# Patient Record
Sex: MALE | Race: BLACK OR AFRICAN AMERICAN | Employment: FULL TIME | URBAN - METROPOLITAN AREA
[De-identification: names, ages, dates, MRNs, and addresses within clinical notes are randomized per-mention and may not be internally consistent; named-entity substitution may affect disease eponyms.]

---

## 2018-01-01 ENCOUNTER — APPOINTMENT (OUTPATIENT)
Dept: GENERAL RADIOLOGY | Facility: HOSPITAL | Age: 53
DRG: 004 | End: 2018-01-01
Attending: INTERNAL MEDICINE
Payer: COMMERCIAL

## 2018-01-01 ENCOUNTER — APPOINTMENT (OUTPATIENT)
Dept: INTERVENTIONAL RADIOLOGY/VASCULAR | Facility: HOSPITAL | Age: 53
DRG: 004 | End: 2018-01-01
Attending: RADIOLOGY
Payer: COMMERCIAL

## 2018-01-01 ENCOUNTER — APPOINTMENT (OUTPATIENT)
Dept: GENERAL RADIOLOGY | Facility: HOSPITAL | Age: 53
DRG: 004 | End: 2018-01-01
Attending: SURGERY
Payer: COMMERCIAL

## 2018-01-01 ENCOUNTER — APPOINTMENT (OUTPATIENT)
Dept: CV DIAGNOSTICS | Facility: HOSPITAL | Age: 53
DRG: 004 | End: 2018-01-01
Attending: INTERNAL MEDICINE
Payer: COMMERCIAL

## 2018-01-01 ENCOUNTER — APPOINTMENT (OUTPATIENT)
Dept: INTERVENTIONAL RADIOLOGY/VASCULAR | Facility: HOSPITAL | Age: 53
DRG: 004 | End: 2018-01-01
Attending: CLINICAL NURSE SPECIALIST
Payer: COMMERCIAL

## 2018-01-01 ENCOUNTER — APPOINTMENT (OUTPATIENT)
Dept: INTERVENTIONAL RADIOLOGY/VASCULAR | Facility: HOSPITAL | Age: 53
DRG: 004 | End: 2018-01-01
Attending: INTERNAL MEDICINE
Payer: COMMERCIAL

## 2018-01-01 ENCOUNTER — APPOINTMENT (OUTPATIENT)
Dept: CT IMAGING | Facility: HOSPITAL | Age: 53
DRG: 004 | End: 2018-01-01
Attending: INTERNAL MEDICINE
Payer: COMMERCIAL

## 2018-01-01 ENCOUNTER — APPOINTMENT (OUTPATIENT)
Dept: CT IMAGING | Facility: HOSPITAL | Age: 53
DRG: 004 | End: 2018-01-01
Attending: SURGERY
Payer: COMMERCIAL

## 2018-01-01 ENCOUNTER — ANESTHESIA (OUTPATIENT)
Dept: SURGERY | Facility: HOSPITAL | Age: 53
DRG: 004 | End: 2018-01-01
Payer: COMMERCIAL

## 2018-01-01 ENCOUNTER — APPOINTMENT (OUTPATIENT)
Dept: GENERAL RADIOLOGY | Facility: HOSPITAL | Age: 53
DRG: 004 | End: 2018-01-01
Attending: CLINICAL NURSE SPECIALIST
Payer: COMMERCIAL

## 2018-01-01 ENCOUNTER — APPOINTMENT (OUTPATIENT)
Dept: MRI IMAGING | Facility: HOSPITAL | Age: 53
DRG: 004 | End: 2018-01-01
Attending: SURGERY
Payer: COMMERCIAL

## 2018-01-01 ENCOUNTER — LAB ENCOUNTER (OUTPATIENT)
Dept: LAB | Facility: HOSPITAL | Age: 53
End: 2018-01-01
Attending: INTERNAL MEDICINE
Payer: COMMERCIAL

## 2018-01-01 ENCOUNTER — ANESTHESIA EVENT (OUTPATIENT)
Dept: ENDOSCOPY | Facility: HOSPITAL | Age: 53
DRG: 004 | End: 2018-01-01
Payer: COMMERCIAL

## 2018-01-01 ENCOUNTER — ANESTHESIA (OUTPATIENT)
Dept: ENDOSCOPY | Facility: HOSPITAL | Age: 53
DRG: 004 | End: 2018-01-01
Payer: COMMERCIAL

## 2018-01-01 ENCOUNTER — ANESTHESIA EVENT (OUTPATIENT)
Dept: MEDSURG UNIT | Facility: HOSPITAL | Age: 53
End: 2018-01-01

## 2018-01-01 ENCOUNTER — APPOINTMENT (OUTPATIENT)
Dept: ULTRASOUND IMAGING | Facility: HOSPITAL | Age: 53
DRG: 004 | End: 2018-01-01
Attending: EMERGENCY MEDICINE
Payer: COMMERCIAL

## 2018-01-01 ENCOUNTER — HOSPITAL ENCOUNTER (INPATIENT)
Facility: HOSPITAL | Age: 53
LOS: 22 days | DRG: 004 | End: 2018-01-01
Attending: EMERGENCY MEDICINE | Admitting: INTERNAL MEDICINE
Payer: COMMERCIAL

## 2018-01-01 ENCOUNTER — APPOINTMENT (OUTPATIENT)
Dept: GENERAL RADIOLOGY | Facility: HOSPITAL | Age: 53
DRG: 004 | End: 2018-01-01
Attending: RADIOLOGY
Payer: COMMERCIAL

## 2018-01-01 ENCOUNTER — APPOINTMENT (OUTPATIENT)
Dept: GENERAL RADIOLOGY | Facility: HOSPITAL | Age: 53
DRG: 004 | End: 2018-01-01
Attending: HOSPITALIST
Payer: COMMERCIAL

## 2018-01-01 ENCOUNTER — APPOINTMENT (OUTPATIENT)
Dept: INTERVENTIONAL RADIOLOGY/VASCULAR | Facility: HOSPITAL | Age: 53
DRG: 004 | End: 2018-01-01
Attending: SURGERY
Payer: COMMERCIAL

## 2018-01-01 ENCOUNTER — APPOINTMENT (OUTPATIENT)
Dept: CT IMAGING | Facility: HOSPITAL | Age: 53
DRG: 004 | End: 2018-01-01
Attending: EMERGENCY MEDICINE
Payer: COMMERCIAL

## 2018-01-01 ENCOUNTER — ANESTHESIA (OUTPATIENT)
Dept: MEDSURG UNIT | Facility: HOSPITAL | Age: 53
End: 2018-01-01

## 2018-01-01 ENCOUNTER — APPOINTMENT (OUTPATIENT)
Dept: MRI IMAGING | Facility: HOSPITAL | Age: 53
DRG: 004 | End: 2018-01-01
Attending: INTERNAL MEDICINE
Payer: COMMERCIAL

## 2018-01-01 ENCOUNTER — ANESTHESIA EVENT (OUTPATIENT)
Dept: SURGERY | Facility: HOSPITAL | Age: 53
DRG: 004 | End: 2018-01-01
Payer: COMMERCIAL

## 2018-01-01 VITALS
RESPIRATION RATE: 19 BRPM | TEMPERATURE: 100 F | BODY MASS INDEX: 42.09 KG/M2 | HEIGHT: 70 IN | HEART RATE: 91 BPM | OXYGEN SATURATION: 42 % | DIASTOLIC BLOOD PRESSURE: 21 MMHG | SYSTOLIC BLOOD PRESSURE: 32 MMHG | WEIGHT: 294 LBS

## 2018-01-01 DIAGNOSIS — R35.89 POLYURIA: ICD-10-CM

## 2018-01-01 DIAGNOSIS — R94.5 LIVER FUNCTION ABNORMALITY: ICD-10-CM

## 2018-01-01 DIAGNOSIS — R53.83 FATIGUE: Primary | ICD-10-CM

## 2018-01-01 DIAGNOSIS — K85.90 ACUTE PANCREATITIS, UNSPECIFIED COMPLICATION STATUS, UNSPECIFIED PANCREATITIS TYPE: Primary | ICD-10-CM

## 2018-01-01 LAB
ALBUMIN SERPL BCP-MCNC: 1.6 G/DL (ref 3.5–4.8)
ALBUMIN SERPL BCP-MCNC: 1.7 G/DL (ref 3.5–4.8)
ALBUMIN SERPL BCP-MCNC: 1.7 G/DL (ref 3.5–4.8)
ALBUMIN SERPL BCP-MCNC: 1.8 G/DL (ref 3.5–4.8)
ALBUMIN SERPL BCP-MCNC: 1.9 G/DL (ref 3.5–4.8)
ALBUMIN SERPL BCP-MCNC: 2 G/DL (ref 3.5–4.8)
ALBUMIN SERPL BCP-MCNC: 2.1 G/DL (ref 3.5–4.8)
ALBUMIN SERPL BCP-MCNC: 2.6 G/DL (ref 3.5–4.8)
ALBUMIN SERPL BCP-MCNC: 2.8 G/DL (ref 3.5–4.8)
ALBUMIN SERPL BCP-MCNC: 3 G/DL (ref 3.5–4.8)
ALBUMIN SERPL BCP-MCNC: 3 G/DL (ref 3.5–4.8)
ALBUMIN SERPL BCP-MCNC: 3.5 G/DL (ref 3.5–4.8)
ALBUMIN SERPL BCP-MCNC: 4.4 G/DL (ref 3.5–4.8)
ALBUMIN/GLOB SERPL: 0.4 {RATIO} (ref 1–2)
ALBUMIN/GLOB SERPL: 0.4 {RATIO} (ref 1–2)
ALBUMIN/GLOB SERPL: 0.5 {RATIO} (ref 1–2)
ALBUMIN/GLOB SERPL: 0.7 {RATIO} (ref 1–2)
ALBUMIN/GLOB SERPL: 0.8 {RATIO} (ref 1–2)
ALBUMIN/GLOB SERPL: 1 {RATIO} (ref 1–2)
ALBUMIN/GLOB SERPL: 1.2 {RATIO} (ref 1–2)
ALP SERPL-CCNC: 104 U/L (ref 32–100)
ALP SERPL-CCNC: 104 U/L (ref 32–100)
ALP SERPL-CCNC: 112 U/L (ref 32–100)
ALP SERPL-CCNC: 48 U/L (ref 32–100)
ALP SERPL-CCNC: 53 U/L (ref 32–100)
ALP SERPL-CCNC: 55 U/L (ref 32–100)
ALP SERPL-CCNC: 70 U/L (ref 32–100)
ALP SERPL-CCNC: 71 U/L (ref 32–100)
ALP SERPL-CCNC: 72 U/L (ref 32–100)
ALP SERPL-CCNC: 78 U/L (ref 32–100)
ALP SERPL-CCNC: 84 U/L (ref 32–100)
ALP SERPL-CCNC: 85 U/L (ref 32–100)
ALP SERPL-CCNC: 86 U/L (ref 32–100)
ALP SERPL-CCNC: 89 U/L (ref 32–100)
ALT SERPL-CCNC: 106 U/L (ref 17–63)
ALT SERPL-CCNC: 125 U/L (ref 17–63)
ALT SERPL-CCNC: 214 U/L (ref 17–63)
ALT SERPL-CCNC: 219 U/L (ref 17–63)
ALT SERPL-CCNC: 229 U/L (ref 17–63)
ALT SERPL-CCNC: 35 U/L (ref 17–63)
ALT SERPL-CCNC: 354 U/L (ref 17–63)
ALT SERPL-CCNC: 356 U/L (ref 17–63)
ALT SERPL-CCNC: 427 U/L (ref 17–63)
ALT SERPL-CCNC: 53 U/L (ref 17–63)
ALT SERPL-CCNC: 58 U/L (ref 17–63)
ALT SERPL-CCNC: 59 U/L (ref 17–63)
ALT SERPL-CCNC: 86 U/L (ref 17–63)
ALT SERPL-CCNC: 91 U/L (ref 17–63)
AMYLASE FLD-CCNC: 6320 U/L
AMYLASE SERPL-CCNC: 1251 U/L (ref 24–108)
AMYLASE SERPL-CCNC: 59 U/L (ref 24–108)
AMYLASE SERPL-CCNC: 82 U/L (ref 24–108)
ANION GAP SERPL CALC-SCNC: 10 MMOL/L (ref 0–18)
ANION GAP SERPL CALC-SCNC: 11 MMOL/L (ref 0–18)
ANION GAP SERPL CALC-SCNC: 12 MMOL/L (ref 0–18)
ANION GAP SERPL CALC-SCNC: 13 MMOL/L (ref 0–18)
ANION GAP SERPL CALC-SCNC: 14 MMOL/L (ref 0–18)
ANION GAP SERPL CALC-SCNC: 14 MMOL/L (ref 0–18)
ANION GAP SERPL CALC-SCNC: 15 MMOL/L (ref 0–18)
ANION GAP SERPL CALC-SCNC: 16 MMOL/L (ref 0–18)
ANION GAP SERPL CALC-SCNC: 18 MMOL/L (ref 0–18)
ANION GAP SERPL CALC-SCNC: 19 MMOL/L (ref 0–18)
ANION GAP SERPL CALC-SCNC: 8 MMOL/L (ref 0–18)
ANION GAP SERPL CALC-SCNC: 9 MMOL/L (ref 0–18)
ANTIBODY SCREEN: NEGATIVE
APTT PPP: 100 SECONDS (ref 23.2–35.3)
APTT PPP: 106.1 SECONDS (ref 23.2–35.3)
APTT PPP: 119.9 SECONDS (ref 23.2–35.3)
APTT PPP: 126.5 SECONDS (ref 23.2–35.3)
APTT PPP: 137.6 SECONDS (ref 23.2–35.3)
APTT PPP: 148.8 SECONDS (ref 23.2–35.3)
APTT PPP: 30.8 SECONDS (ref 23.2–35.3)
APTT PPP: 31.8 SECONDS (ref 23.2–35.3)
APTT PPP: 32 SECONDS (ref 23.2–35.3)
APTT PPP: 37.7 SECONDS (ref 23.2–35.3)
APTT PPP: 38.3 SECONDS (ref 23.2–35.3)
APTT PPP: 40.5 SECONDS (ref 23.2–35.3)
APTT PPP: 42.2 SECONDS (ref 23.2–35.3)
APTT PPP: 43.4 SECONDS (ref 23.2–35.3)
APTT PPP: 45.1 SECONDS (ref 23.2–35.3)
APTT PPP: 45.5 SECONDS (ref 23.2–35.3)
APTT PPP: 49 SECONDS (ref 23.2–35.3)
APTT PPP: 49.4 SECONDS (ref 23.2–35.3)
APTT PPP: 50.7 SECONDS (ref 23.2–35.3)
APTT PPP: 59.6 SECONDS (ref 23.2–35.3)
APTT PPP: 59.6 SECONDS (ref 23.2–35.3)
APTT PPP: 59.8 SECONDS (ref 23.2–35.3)
APTT PPP: 61.5 SECONDS (ref 23.2–35.3)
APTT PPP: 62.1 SECONDS (ref 23.2–35.3)
APTT PPP: 71 SECONDS (ref 23.2–35.3)
APTT PPP: 77 SECONDS (ref 23.2–35.3)
APTT PPP: 77 SECONDS (ref 23.2–35.3)
APTT PPP: 77.3 SECONDS (ref 23.2–35.3)
APTT PPP: 77.3 SECONDS (ref 23.2–35.3)
APTT PPP: 79.4 SECONDS (ref 23.2–35.3)
APTT PPP: 79.6 SECONDS (ref 23.2–35.3)
APTT PPP: 83.1 SECONDS (ref 23.2–35.3)
APTT PPP: 96 SECONDS (ref 23.2–35.3)
AST SERPL-CCNC: 115 U/L (ref 15–41)
AST SERPL-CCNC: 121 U/L (ref 15–41)
AST SERPL-CCNC: 131 U/L (ref 15–41)
AST SERPL-CCNC: 137 U/L (ref 15–41)
AST SERPL-CCNC: 140 U/L (ref 15–41)
AST SERPL-CCNC: 160 U/L (ref 15–41)
AST SERPL-CCNC: 242 U/L (ref 15–41)
AST SERPL-CCNC: 272 U/L (ref 15–41)
AST SERPL-CCNC: 307 U/L (ref 15–41)
AST SERPL-CCNC: 580 U/L (ref 15–41)
AST SERPL-CCNC: 715 U/L (ref 15–41)
AST SERPL-CCNC: 72 U/L (ref 15–41)
AST SERPL-CCNC: 789 U/L (ref 15–41)
AST SERPL-CCNC: 91 U/L (ref 15–41)
BASE EXCESS BLD CALC-SCNC: -1.3 MMOL/L (ref ?–2)
BASE EXCESS BLD CALC-SCNC: -12.5 MMOL/L (ref ?–2)
BASE EXCESS BLD CALC-SCNC: -3.7 MMOL/L (ref ?–2)
BASE EXCESS BLD CALC-SCNC: -3.9 MMOL/L (ref ?–2)
BASE EXCESS BLD CALC-SCNC: -4.4 MMOL/L (ref ?–2)
BASE EXCESS BLD CALC-SCNC: -4.4 MMOL/L (ref ?–2)
BASE EXCESS BLD CALC-SCNC: -5 MMOL/L (ref ?–2)
BASE EXCESS BLD CALC-SCNC: -8.1 MMOL/L (ref ?–2)
BASE EXCESS BLD CALC-SCNC: 2.4 MMOL/L (ref ?–2)
BASOPHILS # BLD: 0 K/UL (ref 0–0.2)
BASOPHILS # BLD: 0.1 K/UL (ref 0–0.2)
BASOPHILS # BLD: 0.1 K/UL (ref 0–0.2)
BASOPHILS # BLD: 0.2 K/UL (ref 0–0.2)
BASOPHILS # BLD: 0.4 K/UL (ref 0–0.2)
BASOPHILS NFR BLD: 0 %
BASOPHILS NFR BLD: 1 %
BILIRUB DIRECT SERPL-MCNC: 0.4 MG/DL (ref 0–0.2)
BILIRUB DIRECT SERPL-MCNC: 0.5 MG/DL (ref 0–0.2)
BILIRUB DIRECT SERPL-MCNC: 0.8 MG/DL (ref 0–0.2)
BILIRUB DIRECT SERPL-MCNC: 1.9 MG/DL (ref 0–0.2)
BILIRUB DIRECT SERPL-MCNC: 2 MG/DL (ref 0–0.2)
BILIRUB DIRECT SERPL-MCNC: 3.3 MG/DL (ref 0–0.2)
BILIRUB SERPL-MCNC: 0.3 MG/DL (ref 0.3–1.2)
BILIRUB SERPL-MCNC: 0.8 MG/DL (ref 0.3–1.2)
BILIRUB SERPL-MCNC: 1 MG/DL (ref 0.3–1.2)
BILIRUB SERPL-MCNC: 1.2 MG/DL (ref 0.3–1.2)
BILIRUB SERPL-MCNC: 1.3 MG/DL (ref 0.3–1.2)
BILIRUB SERPL-MCNC: 1.3 MG/DL (ref 0.3–1.2)
BILIRUB SERPL-MCNC: 2.6 MG/DL (ref 0.3–1.2)
BILIRUB SERPL-MCNC: 3.2 MG/DL (ref 0.3–1.2)
BILIRUB SERPL-MCNC: 3.5 MG/DL (ref 0.3–1.2)
BILIRUB SERPL-MCNC: 3.7 MG/DL (ref 0.3–1.2)
BILIRUB SERPL-MCNC: 4.5 MG/DL (ref 0.3–1.2)
BILIRUB SERPL-MCNC: 5 MG/DL (ref 0.3–1.2)
BILIRUB UR QL: NEGATIVE
BLOOD GAS EPAP: 5 CM H2O
BLOOD GAS IPAP: 12 CM H2O
BLOOD TYPE BARCODE: 5100
BLOOD TYPE BARCODE: 9500
BLOOD TYPE BARCODE: 9500
BUN SERPL-MCNC: 102 MG/DL (ref 8–20)
BUN SERPL-MCNC: 103 MG/DL (ref 8–20)
BUN SERPL-MCNC: 106 MG/DL (ref 8–20)
BUN SERPL-MCNC: 107 MG/DL (ref 8–20)
BUN SERPL-MCNC: 109 MG/DL (ref 8–20)
BUN SERPL-MCNC: 111 MG/DL (ref 8–20)
BUN SERPL-MCNC: 114 MG/DL (ref 8–20)
BUN SERPL-MCNC: 115 MG/DL (ref 8–20)
BUN SERPL-MCNC: 117 MG/DL (ref 8–20)
BUN SERPL-MCNC: 120 MG/DL (ref 8–20)
BUN SERPL-MCNC: 125 MG/DL (ref 8–20)
BUN SERPL-MCNC: 13 MG/DL (ref 8–20)
BUN SERPL-MCNC: 19 MG/DL (ref 8–20)
BUN SERPL-MCNC: 19 MG/DL (ref 8–20)
BUN SERPL-MCNC: 35 MG/DL (ref 8–20)
BUN SERPL-MCNC: 46 MG/DL (ref 8–20)
BUN SERPL-MCNC: 49 MG/DL (ref 8–20)
BUN SERPL-MCNC: 49 MG/DL (ref 8–20)
BUN SERPL-MCNC: 51 MG/DL (ref 8–20)
BUN SERPL-MCNC: 60 MG/DL (ref 8–20)
BUN SERPL-MCNC: 60 MG/DL (ref 8–20)
BUN SERPL-MCNC: 66 MG/DL (ref 8–20)
BUN SERPL-MCNC: 74 MG/DL (ref 8–20)
BUN SERPL-MCNC: 75 MG/DL (ref 8–20)
BUN SERPL-MCNC: 76 MG/DL (ref 8–20)
BUN SERPL-MCNC: 76 MG/DL (ref 8–20)
BUN SERPL-MCNC: 77 MG/DL (ref 8–20)
BUN SERPL-MCNC: 79 MG/DL (ref 8–20)
BUN SERPL-MCNC: 79 MG/DL (ref 8–20)
BUN SERPL-MCNC: 81 MG/DL (ref 8–20)
BUN SERPL-MCNC: 81 MG/DL (ref 8–20)
BUN SERPL-MCNC: 82 MG/DL (ref 8–20)
BUN SERPL-MCNC: 84 MG/DL (ref 8–20)
BUN SERPL-MCNC: 84 MG/DL (ref 8–20)
BUN SERPL-MCNC: 85 MG/DL (ref 8–20)
BUN SERPL-MCNC: 86 MG/DL (ref 8–20)
BUN SERPL-MCNC: 86 MG/DL (ref 8–20)
BUN SERPL-MCNC: 87 MG/DL (ref 8–20)
BUN SERPL-MCNC: 88 MG/DL (ref 8–20)
BUN SERPL-MCNC: 88 MG/DL (ref 8–20)
BUN SERPL-MCNC: 89 MG/DL (ref 8–20)
BUN SERPL-MCNC: 91 MG/DL (ref 8–20)
BUN SERPL-MCNC: 96 MG/DL (ref 8–20)
BUN SERPL-MCNC: 97 MG/DL (ref 8–20)
BUN SERPL-MCNC: 98 MG/DL (ref 8–20)
BUN SERPL-MCNC: 98 MG/DL (ref 8–20)
BUN/CREAT SERPL: 10 (ref 10–20)
BUN/CREAT SERPL: 10.4 (ref 10–20)
BUN/CREAT SERPL: 10.5 (ref 10–20)
BUN/CREAT SERPL: 10.9 (ref 10–20)
BUN/CREAT SERPL: 11 (ref 10–20)
BUN/CREAT SERPL: 11.5 (ref 10–20)
BUN/CREAT SERPL: 12.8 (ref 10–20)
BUN/CREAT SERPL: 13.7 (ref 10–20)
BUN/CREAT SERPL: 13.8 (ref 10–20)
BUN/CREAT SERPL: 13.9 (ref 10–20)
BUN/CREAT SERPL: 16 (ref 10–20)
BUN/CREAT SERPL: 17.1 (ref 10–20)
BUN/CREAT SERPL: 17.2 (ref 10–20)
BUN/CREAT SERPL: 17.3 (ref 10–20)
BUN/CREAT SERPL: 17.9 (ref 10–20)
BUN/CREAT SERPL: 18 (ref 10–20)
BUN/CREAT SERPL: 18.1 (ref 10–20)
BUN/CREAT SERPL: 19.8 (ref 10–20)
BUN/CREAT SERPL: 19.9 (ref 10–20)
BUN/CREAT SERPL: 20.6 (ref 10–20)
BUN/CREAT SERPL: 20.6 (ref 10–20)
BUN/CREAT SERPL: 20.8 (ref 10–20)
BUN/CREAT SERPL: 20.9 (ref 10–20)
BUN/CREAT SERPL: 21.1 (ref 10–20)
BUN/CREAT SERPL: 21.3 (ref 10–20)
BUN/CREAT SERPL: 21.5 (ref 10–20)
BUN/CREAT SERPL: 22 (ref 10–20)
BUN/CREAT SERPL: 22.3 (ref 10–20)
BUN/CREAT SERPL: 22.7 (ref 10–20)
BUN/CREAT SERPL: 23.1 (ref 10–20)
BUN/CREAT SERPL: 24.5 (ref 10–20)
BUN/CREAT SERPL: 24.5 (ref 10–20)
BUN/CREAT SERPL: 24.8 (ref 10–20)
BUN/CREAT SERPL: 25.1 (ref 10–20)
BUN/CREAT SERPL: 26.1 (ref 10–20)
BUN/CREAT SERPL: 26.9 (ref 10–20)
BUN/CREAT SERPL: 30.6 (ref 10–20)
BUN/CREAT SERPL: 33.5 (ref 10–20)
BUN/CREAT SERPL: 34.5 (ref 10–20)
BUN/CREAT SERPL: 9 (ref 10–20)
BUN/CREAT SERPL: 9.1 (ref 10–20)
BUN/CREAT SERPL: 9.2 (ref 10–20)
BUN/CREAT SERPL: 9.5 (ref 10–20)
BUN/CREAT SERPL: 9.7 (ref 10–20)
BUN/CREAT SERPL: 9.7 (ref 10–20)
BUN/CREAT SERPL: 9.9 (ref 10–20)
C DIFF TOX B STL QL: NEGATIVE
CA-I BLD-SCNC: 0.92 MMOL/L (ref 0.95–1.32)
CA-I BLD-SCNC: 0.99 MMOL/L (ref 0.95–1.32)
CA-I BLD-SCNC: 1.01 MMOL/L (ref 0.95–1.32)
CA-I BLD-SCNC: 1.06 MMOL/L (ref 0.95–1.32)
CA-I BLD-SCNC: 1.07 MMOL/L (ref 0.95–1.32)
CA-I BLD-SCNC: 1.08 MMOL/L (ref 0.95–1.32)
CA-I BLD-SCNC: 1.1 MMOL/L (ref 0.95–1.32)
CA-I BLD-SCNC: 1.1 MMOL/L (ref 0.95–1.32)
CALCIUM IONIZED PH 7.4: 1.07 MMOL/L
CALCIUM IONIZED, SERUM: 1.12 MMOL/L
CALCIUM SERPL-MCNC: 5.4 MG/DL (ref 8.5–10.5)
CALCIUM SERPL-MCNC: 6 MG/DL (ref 8.5–10.5)
CALCIUM SERPL-MCNC: 6.2 MG/DL (ref 8.5–10.5)
CALCIUM SERPL-MCNC: 6.3 MG/DL (ref 8.5–10.5)
CALCIUM SERPL-MCNC: 6.5 MG/DL (ref 8.5–10.5)
CALCIUM SERPL-MCNC: 6.6 MG/DL (ref 8.5–10.5)
CALCIUM SERPL-MCNC: 6.8 MG/DL (ref 8.5–10.5)
CALCIUM SERPL-MCNC: 6.9 MG/DL (ref 8.5–10.5)
CALCIUM SERPL-MCNC: 7.2 MG/DL (ref 8.5–10.5)
CALCIUM SERPL-MCNC: 7.4 MG/DL (ref 8.5–10.5)
CALCIUM SERPL-MCNC: 7.5 MG/DL (ref 8.5–10.5)
CALCIUM SERPL-MCNC: 7.6 MG/DL (ref 8.5–10.5)
CALCIUM SERPL-MCNC: 7.7 MG/DL (ref 8.5–10.5)
CALCIUM SERPL-MCNC: 7.8 MG/DL (ref 8.5–10.5)
CALCIUM SERPL-MCNC: 7.9 MG/DL (ref 8.5–10.5)
CALCIUM SERPL-MCNC: 8 MG/DL (ref 8.5–10.5)
CALCIUM SERPL-MCNC: 8.1 MG/DL (ref 8.5–10.5)
CALCIUM SERPL-MCNC: 8.2 MG/DL (ref 8.5–10.5)
CALCIUM SERPL-MCNC: 9.4 MG/DL (ref 8.5–10.5)
CHLORIDE SERPL-SCNC: 100 MMOL/L (ref 95–110)
CHLORIDE SERPL-SCNC: 101 MMOL/L (ref 95–110)
CHLORIDE SERPL-SCNC: 102 MMOL/L (ref 95–110)
CHLORIDE SERPL-SCNC: 103 MMOL/L (ref 95–110)
CHLORIDE SERPL-SCNC: 104 MMOL/L (ref 95–110)
CHLORIDE SERPL-SCNC: 105 MMOL/L (ref 95–110)
CHLORIDE SERPL-SCNC: 106 MMOL/L (ref 95–110)
CHLORIDE SERPL-SCNC: 107 MMOL/L (ref 95–110)
CHLORIDE SERPL-SCNC: 107 MMOL/L (ref 95–110)
CHLORIDE SERPL-SCNC: 108 MMOL/L (ref 95–110)
CHLORIDE SERPL-SCNC: 108 MMOL/L (ref 95–110)
CHLORIDE SERPL-SCNC: 109 MMOL/L (ref 95–110)
CHLORIDE SERPL-SCNC: 109 MMOL/L (ref 95–110)
CHLORIDE SERPL-SCNC: 111 MMOL/L (ref 95–110)
CHLORIDE SERPL-SCNC: 97 MMOL/L (ref 95–110)
CHLORIDE SERPL-SCNC: 99 MMOL/L (ref 95–110)
CK SERPL-CCNC: 1178 U/L (ref 49–397)
CK SERPL-CCNC: 2650 U/L (ref 49–397)
CK SERPL-CCNC: 6340 U/L (ref 49–397)
CK SERPL-CCNC: 7170 U/L (ref 49–397)
CLARITY UR: CLEAR
CMV IGG SERPL QL: NEGATIVE
CMV IGM SERPL QL: NEGATIVE
CO2 SERPL-SCNC: 14 MMOL/L (ref 22–32)
CO2 SERPL-SCNC: 15 MMOL/L (ref 22–32)
CO2 SERPL-SCNC: 19 MMOL/L (ref 22–32)
CO2 SERPL-SCNC: 19 MMOL/L (ref 22–32)
CO2 SERPL-SCNC: 20 MMOL/L (ref 22–32)
CO2 SERPL-SCNC: 21 MMOL/L (ref 22–32)
CO2 SERPL-SCNC: 22 MMOL/L (ref 22–32)
CO2 SERPL-SCNC: 23 MMOL/L (ref 22–32)
CO2 SERPL-SCNC: 24 MMOL/L (ref 22–32)
CO2 SERPL-SCNC: 25 MMOL/L (ref 22–32)
CO2 SERPL-SCNC: 27 MMOL/L (ref 22–32)
CO2 SERPL-SCNC: 27 MMOL/L (ref 22–32)
COHGB MFR BLD: 1.7 % (ref 0–1.5)
COHGB MFR BLD: 2 % (ref 0–1.5)
COHGB MFR BLD: 2.4 % (ref 0–1.5)
COLOR UR: YELLOW
CREAT SERPL-MCNC: 1.31 MG/DL (ref 0.5–1.5)
CREAT SERPL-MCNC: 1.65 MG/DL (ref 0.5–1.5)
CREAT SERPL-MCNC: 1.73 MG/DL (ref 0.5–1.5)
CREAT SERPL-MCNC: 10.42 MG/DL (ref 0.5–1.5)
CREAT SERPL-MCNC: 2.87 MG/DL (ref 0.5–1.5)
CREAT SERPL-MCNC: 2.88 MG/DL (ref 0.5–1.5)
CREAT SERPL-MCNC: 3.03 MG/DL (ref 0.5–1.5)
CREAT SERPL-MCNC: 3.16 MG/DL (ref 0.5–1.5)
CREAT SERPL-MCNC: 3.33 MG/DL (ref 0.5–1.5)
CREAT SERPL-MCNC: 3.36 MG/DL (ref 0.5–1.5)
CREAT SERPL-MCNC: 3.69 MG/DL (ref 0.5–1.5)
CREAT SERPL-MCNC: 3.75 MG/DL (ref 0.5–1.5)
CREAT SERPL-MCNC: 3.79 MG/DL (ref 0.5–1.5)
CREAT SERPL-MCNC: 3.83 MG/DL (ref 0.5–1.5)
CREAT SERPL-MCNC: 3.88 MG/DL (ref 0.5–1.5)
CREAT SERPL-MCNC: 3.92 MG/DL (ref 0.5–1.5)
CREAT SERPL-MCNC: 3.92 MG/DL (ref 0.5–1.5)
CREAT SERPL-MCNC: 3.96 MG/DL (ref 0.5–1.5)
CREAT SERPL-MCNC: 4.14 MG/DL (ref 0.5–1.5)
CREAT SERPL-MCNC: 4.31 MG/DL (ref 0.5–1.5)
CREAT SERPL-MCNC: 4.37 MG/DL (ref 0.5–1.5)
CREAT SERPL-MCNC: 4.48 MG/DL (ref 0.5–1.5)
CREAT SERPL-MCNC: 4.49 MG/DL (ref 0.5–1.5)
CREAT SERPL-MCNC: 4.58 MG/DL (ref 0.5–1.5)
CREAT SERPL-MCNC: 4.59 MG/DL (ref 0.5–1.5)
CREAT SERPL-MCNC: 4.62 MG/DL (ref 0.5–1.5)
CREAT SERPL-MCNC: 4.69 MG/DL (ref 0.5–1.5)
CREAT SERPL-MCNC: 4.72 MG/DL (ref 0.5–1.5)
CREAT SERPL-MCNC: 4.72 MG/DL (ref 0.5–1.5)
CREAT SERPL-MCNC: 4.92 MG/DL (ref 0.5–1.5)
CREAT SERPL-MCNC: 4.94 MG/DL (ref 0.5–1.5)
CREAT SERPL-MCNC: 4.98 MG/DL (ref 0.5–1.5)
CREAT SERPL-MCNC: 4.98 MG/DL (ref 0.5–1.5)
CREAT SERPL-MCNC: 5.11 MG/DL (ref 0.5–1.5)
CREAT SERPL-MCNC: 5.15 MG/DL (ref 0.5–1.5)
CREAT SERPL-MCNC: 5.39 MG/DL (ref 0.5–1.5)
CREAT SERPL-MCNC: 5.69 MG/DL (ref 0.5–1.5)
CREAT SERPL-MCNC: 5.97 MG/DL (ref 0.5–1.5)
CREAT SERPL-MCNC: 6.06 MG/DL (ref 0.5–1.5)
CREAT SERPL-MCNC: 6.07 MG/DL (ref 0.5–1.5)
CREAT SERPL-MCNC: 6.17 MG/DL (ref 0.5–1.5)
CREAT SERPL-MCNC: 6.2 MG/DL (ref 0.5–1.5)
CREAT SERPL-MCNC: 6.82 MG/DL (ref 0.5–1.5)
CREAT SERPL-MCNC: 7.5 MG/DL (ref 0.5–1.5)
CREAT SERPL-MCNC: 8.07 MG/DL (ref 0.5–1.5)
CREAT SERPL-MCNC: 8.41 MG/DL (ref 0.5–1.5)
CREAT SERPL-MCNC: 8.47 MG/DL (ref 0.5–1.5)
CREAT SERPL-MCNC: 9.1 MG/DL (ref 0.5–1.5)
CREAT SERPL-MCNC: 9.27 MG/DL (ref 0.5–1.5)
CREAT SERPL-MCNC: 9.82 MG/DL (ref 0.5–1.5)
CREAT UR-MCNC: 260.7 MG/DL
EBV NA IGG SER QL IA: POSITIVE
EBV VCA IGG SER QL IA: POSITIVE
EBV VCA IGM SER QL IA: NEGATIVE
EOSINOPHIL # BLD: 0 K/UL (ref 0–0.7)
EOSINOPHIL # BLD: 0.2 K/UL (ref 0–0.7)
EOSINOPHIL # BLD: 0.3 K/UL (ref 0–0.7)
EOSINOPHIL # BLD: 0.4 K/UL (ref 0–0.7)
EOSINOPHIL # BLD: 0.4 K/UL (ref 0–0.7)
EOSINOPHIL # BLD: 0.5 K/UL (ref 0–0.7)
EOSINOPHIL # BLD: 0.5 K/UL (ref 0–0.7)
EOSINOPHIL # BLD: 0.7 K/UL (ref 0–0.7)
EOSINOPHIL # BLD: 1.3 K/UL (ref 0–0.7)
EOSINOPHIL # BLD: 2.1 K/UL (ref 0–0.7)
EOSINOPHIL NFR BLD: 0 %
EOSINOPHIL NFR BLD: 1 %
EOSINOPHIL NFR BLD: 2 %
EOSINOPHIL NFR BLD: 4 %
EOSINOPHIL NFR BLD: 5 %
ERYTHROCYTE [DISTWIDTH] IN BLOOD BY AUTOMATED COUNT: 14 % (ref 11–15)
ERYTHROCYTE [DISTWIDTH] IN BLOOD BY AUTOMATED COUNT: 14 % (ref 11–15)
ERYTHROCYTE [DISTWIDTH] IN BLOOD BY AUTOMATED COUNT: 14.1 % (ref 11–15)
ERYTHROCYTE [DISTWIDTH] IN BLOOD BY AUTOMATED COUNT: 14.2 % (ref 11–15)
ERYTHROCYTE [DISTWIDTH] IN BLOOD BY AUTOMATED COUNT: 14.2 % (ref 11–15)
ERYTHROCYTE [DISTWIDTH] IN BLOOD BY AUTOMATED COUNT: 14.4 % (ref 11–15)
ERYTHROCYTE [DISTWIDTH] IN BLOOD BY AUTOMATED COUNT: 14.9 % (ref 11–15)
ERYTHROCYTE [DISTWIDTH] IN BLOOD BY AUTOMATED COUNT: 15 % (ref 11–15)
ERYTHROCYTE [DISTWIDTH] IN BLOOD BY AUTOMATED COUNT: 15.1 % (ref 11–15)
ERYTHROCYTE [DISTWIDTH] IN BLOOD BY AUTOMATED COUNT: 15.4 % (ref 11–15)
ERYTHROCYTE [DISTWIDTH] IN BLOOD BY AUTOMATED COUNT: 15.6 % (ref 11–15)
ERYTHROCYTE [DISTWIDTH] IN BLOOD BY AUTOMATED COUNT: 16 % (ref 11–15)
ERYTHROCYTE [DISTWIDTH] IN BLOOD BY AUTOMATED COUNT: 16.1 % (ref 11–15)
ERYTHROCYTE [DISTWIDTH] IN BLOOD BY AUTOMATED COUNT: 16.2 % (ref 11–15)
ERYTHROCYTE [DISTWIDTH] IN BLOOD BY AUTOMATED COUNT: 16.4 % (ref 11–15)
ERYTHROCYTE [DISTWIDTH] IN BLOOD BY AUTOMATED COUNT: 16.6 % (ref 11–15)
ERYTHROCYTE [DISTWIDTH] IN BLOOD BY AUTOMATED COUNT: 16.8 % (ref 11–15)
ERYTHROCYTE [DISTWIDTH] IN BLOOD BY AUTOMATED COUNT: 16.9 % (ref 11–15)
ERYTHROCYTE [DISTWIDTH] IN BLOOD BY AUTOMATED COUNT: 18.1 % (ref 11–15)
ERYTHROCYTE [DISTWIDTH] IN BLOOD BY AUTOMATED COUNT: 18.2 % (ref 11–15)
ERYTHROCYTE [DISTWIDTH] IN BLOOD BY AUTOMATED COUNT: 19 % (ref 11–15)
ERYTHROCYTE [DISTWIDTH] IN BLOOD BY AUTOMATED COUNT: 19.3 % (ref 11–15)
ERYTHROCYTE [DISTWIDTH] IN BLOOD BY AUTOMATED COUNT: 19.5 % (ref 11–15)
ERYTHROCYTE [DISTWIDTH] IN BLOOD BY AUTOMATED COUNT: 19.5 % (ref 11–15)
ERYTHROCYTE [DISTWIDTH] IN BLOOD BY AUTOMATED COUNT: 19.8 % (ref 11–15)
ETHANOL SERPL-MCNC: 3 MG/DL
GLOBULIN PLAS-MCNC: 2.9 G/DL (ref 2.5–3.7)
GLOBULIN PLAS-MCNC: 2.9 G/DL (ref 2.5–3.7)
GLOBULIN PLAS-MCNC: 3 G/DL (ref 2.5–3.7)
GLOBULIN PLAS-MCNC: 3.1 G/DL (ref 2.5–3.7)
GLOBULIN PLAS-MCNC: 3.9 G/DL (ref 2.5–3.7)
GLOBULIN PLAS-MCNC: 4.2 G/DL (ref 2.5–3.7)
GLOBULIN PLAS-MCNC: 4.3 G/DL (ref 2.5–3.7)
GLOBULIN PLAS-MCNC: 4.4 G/DL (ref 2.5–3.7)
GLOBULIN PLAS-MCNC: 4.4 G/DL (ref 2.5–3.7)
GLOBULIN PLAS-MCNC: 4.5 G/DL (ref 2.5–3.7)
GLUCOSE BLDC GLUCOMTR-MCNC: 103 MG/DL (ref 70–99)
GLUCOSE BLDC GLUCOMTR-MCNC: 107 MG/DL (ref 70–99)
GLUCOSE BLDC GLUCOMTR-MCNC: 109 MG/DL (ref 70–99)
GLUCOSE BLDC GLUCOMTR-MCNC: 109 MG/DL (ref 70–99)
GLUCOSE BLDC GLUCOMTR-MCNC: 110 MG/DL (ref 70–99)
GLUCOSE BLDC GLUCOMTR-MCNC: 110 MG/DL (ref 70–99)
GLUCOSE BLDC GLUCOMTR-MCNC: 111 MG/DL (ref 70–99)
GLUCOSE BLDC GLUCOMTR-MCNC: 113 MG/DL (ref 70–99)
GLUCOSE BLDC GLUCOMTR-MCNC: 114 MG/DL (ref 70–99)
GLUCOSE BLDC GLUCOMTR-MCNC: 114 MG/DL (ref 70–99)
GLUCOSE BLDC GLUCOMTR-MCNC: 115 MG/DL (ref 70–99)
GLUCOSE BLDC GLUCOMTR-MCNC: 116 MG/DL (ref 70–99)
GLUCOSE BLDC GLUCOMTR-MCNC: 116 MG/DL (ref 70–99)
GLUCOSE BLDC GLUCOMTR-MCNC: 117 MG/DL (ref 70–99)
GLUCOSE BLDC GLUCOMTR-MCNC: 118 MG/DL (ref 70–99)
GLUCOSE BLDC GLUCOMTR-MCNC: 120 MG/DL (ref 70–99)
GLUCOSE BLDC GLUCOMTR-MCNC: 121 MG/DL (ref 70–99)
GLUCOSE BLDC GLUCOMTR-MCNC: 121 MG/DL (ref 70–99)
GLUCOSE BLDC GLUCOMTR-MCNC: 122 MG/DL (ref 70–99)
GLUCOSE BLDC GLUCOMTR-MCNC: 123 MG/DL (ref 70–99)
GLUCOSE BLDC GLUCOMTR-MCNC: 124 MG/DL (ref 70–99)
GLUCOSE BLDC GLUCOMTR-MCNC: 125 MG/DL (ref 70–99)
GLUCOSE BLDC GLUCOMTR-MCNC: 125 MG/DL (ref 70–99)
GLUCOSE BLDC GLUCOMTR-MCNC: 127 MG/DL (ref 70–99)
GLUCOSE BLDC GLUCOMTR-MCNC: 128 MG/DL (ref 70–99)
GLUCOSE BLDC GLUCOMTR-MCNC: 129 MG/DL (ref 70–99)
GLUCOSE BLDC GLUCOMTR-MCNC: 130 MG/DL (ref 70–99)
GLUCOSE BLDC GLUCOMTR-MCNC: 131 MG/DL (ref 70–99)
GLUCOSE BLDC GLUCOMTR-MCNC: 132 MG/DL (ref 70–99)
GLUCOSE BLDC GLUCOMTR-MCNC: 133 MG/DL (ref 70–99)
GLUCOSE BLDC GLUCOMTR-MCNC: 134 MG/DL (ref 70–99)
GLUCOSE BLDC GLUCOMTR-MCNC: 135 MG/DL (ref 70–99)
GLUCOSE BLDC GLUCOMTR-MCNC: 135 MG/DL (ref 70–99)
GLUCOSE BLDC GLUCOMTR-MCNC: 136 MG/DL (ref 70–99)
GLUCOSE BLDC GLUCOMTR-MCNC: 137 MG/DL (ref 70–99)
GLUCOSE BLDC GLUCOMTR-MCNC: 138 MG/DL (ref 70–99)
GLUCOSE BLDC GLUCOMTR-MCNC: 139 MG/DL (ref 70–99)
GLUCOSE BLDC GLUCOMTR-MCNC: 139 MG/DL (ref 70–99)
GLUCOSE BLDC GLUCOMTR-MCNC: 140 MG/DL (ref 70–99)
GLUCOSE BLDC GLUCOMTR-MCNC: 141 MG/DL (ref 70–99)
GLUCOSE BLDC GLUCOMTR-MCNC: 142 MG/DL (ref 70–99)
GLUCOSE BLDC GLUCOMTR-MCNC: 143 MG/DL (ref 70–99)
GLUCOSE BLDC GLUCOMTR-MCNC: 144 MG/DL (ref 70–99)
GLUCOSE BLDC GLUCOMTR-MCNC: 144 MG/DL (ref 70–99)
GLUCOSE BLDC GLUCOMTR-MCNC: 145 MG/DL (ref 70–99)
GLUCOSE BLDC GLUCOMTR-MCNC: 146 MG/DL (ref 70–99)
GLUCOSE BLDC GLUCOMTR-MCNC: 147 MG/DL (ref 70–99)
GLUCOSE BLDC GLUCOMTR-MCNC: 148 MG/DL (ref 70–99)
GLUCOSE BLDC GLUCOMTR-MCNC: 149 MG/DL (ref 70–99)
GLUCOSE BLDC GLUCOMTR-MCNC: 150 MG/DL (ref 70–99)
GLUCOSE BLDC GLUCOMTR-MCNC: 151 MG/DL (ref 70–99)
GLUCOSE BLDC GLUCOMTR-MCNC: 151 MG/DL (ref 70–99)
GLUCOSE BLDC GLUCOMTR-MCNC: 152 MG/DL (ref 70–99)
GLUCOSE BLDC GLUCOMTR-MCNC: 152 MG/DL (ref 70–99)
GLUCOSE BLDC GLUCOMTR-MCNC: 153 MG/DL (ref 70–99)
GLUCOSE BLDC GLUCOMTR-MCNC: 154 MG/DL (ref 70–99)
GLUCOSE BLDC GLUCOMTR-MCNC: 155 MG/DL (ref 70–99)
GLUCOSE BLDC GLUCOMTR-MCNC: 156 MG/DL (ref 70–99)
GLUCOSE BLDC GLUCOMTR-MCNC: 156 MG/DL (ref 70–99)
GLUCOSE BLDC GLUCOMTR-MCNC: 157 MG/DL (ref 70–99)
GLUCOSE BLDC GLUCOMTR-MCNC: 157 MG/DL (ref 70–99)
GLUCOSE BLDC GLUCOMTR-MCNC: 158 MG/DL (ref 70–99)
GLUCOSE BLDC GLUCOMTR-MCNC: 159 MG/DL (ref 70–99)
GLUCOSE BLDC GLUCOMTR-MCNC: 160 MG/DL (ref 70–99)
GLUCOSE BLDC GLUCOMTR-MCNC: 161 MG/DL (ref 70–99)
GLUCOSE BLDC GLUCOMTR-MCNC: 162 MG/DL (ref 70–99)
GLUCOSE BLDC GLUCOMTR-MCNC: 163 MG/DL (ref 70–99)
GLUCOSE BLDC GLUCOMTR-MCNC: 164 MG/DL (ref 70–99)
GLUCOSE BLDC GLUCOMTR-MCNC: 165 MG/DL (ref 70–99)
GLUCOSE BLDC GLUCOMTR-MCNC: 166 MG/DL (ref 70–99)
GLUCOSE BLDC GLUCOMTR-MCNC: 167 MG/DL (ref 70–99)
GLUCOSE BLDC GLUCOMTR-MCNC: 167 MG/DL (ref 70–99)
GLUCOSE BLDC GLUCOMTR-MCNC: 168 MG/DL (ref 70–99)
GLUCOSE BLDC GLUCOMTR-MCNC: 169 MG/DL (ref 70–99)
GLUCOSE BLDC GLUCOMTR-MCNC: 170 MG/DL (ref 70–99)
GLUCOSE BLDC GLUCOMTR-MCNC: 171 MG/DL (ref 70–99)
GLUCOSE BLDC GLUCOMTR-MCNC: 172 MG/DL (ref 70–99)
GLUCOSE BLDC GLUCOMTR-MCNC: 173 MG/DL (ref 70–99)
GLUCOSE BLDC GLUCOMTR-MCNC: 174 MG/DL (ref 70–99)
GLUCOSE BLDC GLUCOMTR-MCNC: 175 MG/DL (ref 70–99)
GLUCOSE BLDC GLUCOMTR-MCNC: 176 MG/DL (ref 70–99)
GLUCOSE BLDC GLUCOMTR-MCNC: 177 MG/DL (ref 70–99)
GLUCOSE BLDC GLUCOMTR-MCNC: 178 MG/DL (ref 70–99)
GLUCOSE BLDC GLUCOMTR-MCNC: 179 MG/DL (ref 70–99)
GLUCOSE BLDC GLUCOMTR-MCNC: 180 MG/DL (ref 70–99)
GLUCOSE BLDC GLUCOMTR-MCNC: 181 MG/DL (ref 70–99)
GLUCOSE BLDC GLUCOMTR-MCNC: 182 MG/DL (ref 70–99)
GLUCOSE BLDC GLUCOMTR-MCNC: 183 MG/DL (ref 70–99)
GLUCOSE BLDC GLUCOMTR-MCNC: 184 MG/DL (ref 70–99)
GLUCOSE BLDC GLUCOMTR-MCNC: 185 MG/DL (ref 70–99)
GLUCOSE BLDC GLUCOMTR-MCNC: 186 MG/DL (ref 70–99)
GLUCOSE BLDC GLUCOMTR-MCNC: 187 MG/DL (ref 70–99)
GLUCOSE BLDC GLUCOMTR-MCNC: 188 MG/DL (ref 70–99)
GLUCOSE BLDC GLUCOMTR-MCNC: 189 MG/DL (ref 70–99)
GLUCOSE BLDC GLUCOMTR-MCNC: 190 MG/DL (ref 70–99)
GLUCOSE BLDC GLUCOMTR-MCNC: 191 MG/DL (ref 70–99)
GLUCOSE BLDC GLUCOMTR-MCNC: 192 MG/DL (ref 70–99)
GLUCOSE BLDC GLUCOMTR-MCNC: 193 MG/DL (ref 70–99)
GLUCOSE BLDC GLUCOMTR-MCNC: 194 MG/DL (ref 70–99)
GLUCOSE BLDC GLUCOMTR-MCNC: 195 MG/DL (ref 70–99)
GLUCOSE BLDC GLUCOMTR-MCNC: 196 MG/DL (ref 70–99)
GLUCOSE BLDC GLUCOMTR-MCNC: 197 MG/DL (ref 70–99)
GLUCOSE BLDC GLUCOMTR-MCNC: 197 MG/DL (ref 70–99)
GLUCOSE BLDC GLUCOMTR-MCNC: 198 MG/DL (ref 70–99)
GLUCOSE BLDC GLUCOMTR-MCNC: 198 MG/DL (ref 70–99)
GLUCOSE BLDC GLUCOMTR-MCNC: 199 MG/DL (ref 70–99)
GLUCOSE BLDC GLUCOMTR-MCNC: 200 MG/DL (ref 70–99)
GLUCOSE BLDC GLUCOMTR-MCNC: 201 MG/DL (ref 70–99)
GLUCOSE BLDC GLUCOMTR-MCNC: 201 MG/DL (ref 70–99)
GLUCOSE BLDC GLUCOMTR-MCNC: 202 MG/DL (ref 70–99)
GLUCOSE BLDC GLUCOMTR-MCNC: 203 MG/DL (ref 70–99)
GLUCOSE BLDC GLUCOMTR-MCNC: 204 MG/DL (ref 70–99)
GLUCOSE BLDC GLUCOMTR-MCNC: 205 MG/DL (ref 70–99)
GLUCOSE BLDC GLUCOMTR-MCNC: 206 MG/DL (ref 70–99)
GLUCOSE BLDC GLUCOMTR-MCNC: 207 MG/DL (ref 70–99)
GLUCOSE BLDC GLUCOMTR-MCNC: 208 MG/DL (ref 70–99)
GLUCOSE BLDC GLUCOMTR-MCNC: 210 MG/DL (ref 70–99)
GLUCOSE BLDC GLUCOMTR-MCNC: 211 MG/DL (ref 70–99)
GLUCOSE BLDC GLUCOMTR-MCNC: 212 MG/DL (ref 70–99)
GLUCOSE BLDC GLUCOMTR-MCNC: 213 MG/DL (ref 70–99)
GLUCOSE BLDC GLUCOMTR-MCNC: 215 MG/DL (ref 70–99)
GLUCOSE BLDC GLUCOMTR-MCNC: 216 MG/DL (ref 70–99)
GLUCOSE BLDC GLUCOMTR-MCNC: 218 MG/DL (ref 70–99)
GLUCOSE BLDC GLUCOMTR-MCNC: 219 MG/DL (ref 70–99)
GLUCOSE BLDC GLUCOMTR-MCNC: 221 MG/DL (ref 70–99)
GLUCOSE BLDC GLUCOMTR-MCNC: 222 MG/DL (ref 70–99)
GLUCOSE BLDC GLUCOMTR-MCNC: 227 MG/DL (ref 70–99)
GLUCOSE BLDC GLUCOMTR-MCNC: 228 MG/DL (ref 70–99)
GLUCOSE BLDC GLUCOMTR-MCNC: 230 MG/DL (ref 70–99)
GLUCOSE BLDC GLUCOMTR-MCNC: 236 MG/DL (ref 70–99)
GLUCOSE BLDC GLUCOMTR-MCNC: 246 MG/DL (ref 70–99)
GLUCOSE BLDC GLUCOMTR-MCNC: 248 MG/DL (ref 70–99)
GLUCOSE BLDC GLUCOMTR-MCNC: 250 MG/DL (ref 70–99)
GLUCOSE BLDC GLUCOMTR-MCNC: 253 MG/DL (ref 70–99)
GLUCOSE BLDC GLUCOMTR-MCNC: 266 MG/DL (ref 70–99)
GLUCOSE BLDC GLUCOMTR-MCNC: 276 MG/DL (ref 70–99)
GLUCOSE BLDC GLUCOMTR-MCNC: 277 MG/DL (ref 70–99)
GLUCOSE BLDC GLUCOMTR-MCNC: 294 MG/DL (ref 70–99)
GLUCOSE BLDC GLUCOMTR-MCNC: 306 MG/DL (ref 70–99)
GLUCOSE BLDC GLUCOMTR-MCNC: 319 MG/DL (ref 70–99)
GLUCOSE BLDC GLUCOMTR-MCNC: 352 MG/DL (ref 70–99)
GLUCOSE BLDC GLUCOMTR-MCNC: 359 MG/DL (ref 70–99)
GLUCOSE BLDC GLUCOMTR-MCNC: 360 MG/DL (ref 70–99)
GLUCOSE BLDC GLUCOMTR-MCNC: 368 MG/DL (ref 70–99)
GLUCOSE BLDC GLUCOMTR-MCNC: 482 MG/DL (ref 70–99)
GLUCOSE BLDC GLUCOMTR-MCNC: 67 MG/DL (ref 70–99)
GLUCOSE BLDC GLUCOMTR-MCNC: 73 MG/DL (ref 70–99)
GLUCOSE BLDC GLUCOMTR-MCNC: 79 MG/DL (ref 70–99)
GLUCOSE BLDC GLUCOMTR-MCNC: 79 MG/DL (ref 70–99)
GLUCOSE BLDC GLUCOMTR-MCNC: 81 MG/DL (ref 70–99)
GLUCOSE BLDC GLUCOMTR-MCNC: 81 MG/DL (ref 70–99)
GLUCOSE BLDC GLUCOMTR-MCNC: 82 MG/DL (ref 70–99)
GLUCOSE BLDC GLUCOMTR-MCNC: 97 MG/DL (ref 70–99)
GLUCOSE BLDC GLUCOMTR-MCNC: 97 MG/DL (ref 70–99)
GLUCOSE BLDC GLUCOMTR-MCNC: 98 MG/DL (ref 70–99)
GLUCOSE BLDC GLUCOMTR-MCNC: >500 MG/DL (ref 70–99)
GLUCOSE BLDC GLUCOMTR-MCNC: >500 MG/DL (ref 70–99)
GLUCOSE SERPL-MCNC: 128 MG/DL (ref 70–99)
GLUCOSE SERPL-MCNC: 142 MG/DL (ref 70–99)
GLUCOSE SERPL-MCNC: 143 MG/DL (ref 70–99)
GLUCOSE SERPL-MCNC: 144 MG/DL (ref 70–99)
GLUCOSE SERPL-MCNC: 147 MG/DL (ref 70–99)
GLUCOSE SERPL-MCNC: 147 MG/DL (ref 70–99)
GLUCOSE SERPL-MCNC: 149 MG/DL (ref 70–99)
GLUCOSE SERPL-MCNC: 150 MG/DL (ref 70–99)
GLUCOSE SERPL-MCNC: 152 MG/DL (ref 70–99)
GLUCOSE SERPL-MCNC: 153 MG/DL (ref 70–99)
GLUCOSE SERPL-MCNC: 153 MG/DL (ref 70–99)
GLUCOSE SERPL-MCNC: 154 MG/DL (ref 70–99)
GLUCOSE SERPL-MCNC: 155 MG/DL (ref 70–99)
GLUCOSE SERPL-MCNC: 157 MG/DL (ref 70–99)
GLUCOSE SERPL-MCNC: 159 MG/DL (ref 70–99)
GLUCOSE SERPL-MCNC: 161 MG/DL (ref 70–99)
GLUCOSE SERPL-MCNC: 163 MG/DL (ref 70–99)
GLUCOSE SERPL-MCNC: 163 MG/DL (ref 70–99)
GLUCOSE SERPL-MCNC: 164 MG/DL (ref 70–99)
GLUCOSE SERPL-MCNC: 169 MG/DL (ref 70–99)
GLUCOSE SERPL-MCNC: 171 MG/DL (ref 70–99)
GLUCOSE SERPL-MCNC: 174 MG/DL (ref 70–99)
GLUCOSE SERPL-MCNC: 177 MG/DL (ref 70–99)
GLUCOSE SERPL-MCNC: 179 MG/DL (ref 70–99)
GLUCOSE SERPL-MCNC: 180 MG/DL (ref 70–99)
GLUCOSE SERPL-MCNC: 180 MG/DL (ref 70–99)
GLUCOSE SERPL-MCNC: 181 MG/DL (ref 70–99)
GLUCOSE SERPL-MCNC: 182 MG/DL (ref 70–99)
GLUCOSE SERPL-MCNC: 184 MG/DL (ref 70–99)
GLUCOSE SERPL-MCNC: 185 MG/DL (ref 70–99)
GLUCOSE SERPL-MCNC: 191 MG/DL (ref 70–99)
GLUCOSE SERPL-MCNC: 191 MG/DL (ref 70–99)
GLUCOSE SERPL-MCNC: 192 MG/DL (ref 70–99)
GLUCOSE SERPL-MCNC: 193 MG/DL (ref 70–99)
GLUCOSE SERPL-MCNC: 196 MG/DL (ref 70–99)
GLUCOSE SERPL-MCNC: 199 MG/DL (ref 70–99)
GLUCOSE SERPL-MCNC: 220 MG/DL (ref 70–99)
GLUCOSE SERPL-MCNC: 236 MG/DL (ref 70–99)
GLUCOSE SERPL-MCNC: 244 MG/DL (ref 70–99)
GLUCOSE SERPL-MCNC: 255 MG/DL (ref 70–99)
GLUCOSE SERPL-MCNC: 265 MG/DL (ref 70–99)
GLUCOSE SERPL-MCNC: 270 MG/DL (ref 70–99)
GLUCOSE SERPL-MCNC: 379 MG/DL (ref 70–99)
GLUCOSE SERPL-MCNC: 480 MG/DL (ref 70–99)
GLUCOSE SERPL-MCNC: 480 MG/DL (ref 70–99)
GLUCOSE SERPL-MCNC: 98 MG/DL (ref 70–99)
GLUCOSE UR-MCNC: NEGATIVE MG/DL
HAPTOGLOB SERPL-MCNC: <15 MG/DL (ref 16–200)
HBA1C MFR BLD: 5.1 % (ref 4–6)
HBV CORE AB SERPL QL IA: NONREACTIVE
HBV SURFACE AB SER-ACNC: <3.1 MIU/ML (ref ?–10)
HBV SURFACE AG SERPL QL IA: NONREACTIVE
HBV SURFACE AG SERPL QL IA: NONREACTIVE
HCO3 BLDA-SCNC: 14 MEQ/L (ref 21–27)
HCO3 BLDA-SCNC: 16.1 MEQ/L (ref 21–27)
HCO3 BLDA-SCNC: 20.5 MEQ/L (ref 21–27)
HCO3 BLDA-SCNC: 20.5 MEQ/L (ref 21–27)
HCO3 BLDA-SCNC: 21.6 MEQ/L (ref 21–27)
HCO3 BLDA-SCNC: 21.9 MEQ/L (ref 21–27)
HCO3 BLDA-SCNC: 22 MEQ/L (ref 21–27)
HCO3 BLDA-SCNC: 22.8 MEQ/L (ref 21–27)
HCO3 BLDA-SCNC: 26.8 MEQ/L (ref 21–27)
HCT VFR BLD AUTO: 18.8 % (ref 41–52)
HCT VFR BLD AUTO: 19.1 % (ref 41–52)
HCT VFR BLD AUTO: 20.2 % (ref 41–52)
HCT VFR BLD AUTO: 20.3 % (ref 41–52)
HCT VFR BLD AUTO: 20.3 % (ref 41–52)
HCT VFR BLD AUTO: 20.4 % (ref 41–52)
HCT VFR BLD AUTO: 20.4 % (ref 41–52)
HCT VFR BLD AUTO: 20.6 % (ref 41–52)
HCT VFR BLD AUTO: 21.4 % (ref 41–52)
HCT VFR BLD AUTO: 21.8 % (ref 41–52)
HCT VFR BLD AUTO: 21.8 % (ref 41–52)
HCT VFR BLD AUTO: 21.9 % (ref 41–52)
HCT VFR BLD AUTO: 22 % (ref 41–52)
HCT VFR BLD AUTO: 22.1 % (ref 41–52)
HCT VFR BLD AUTO: 22.2 % (ref 41–52)
HCT VFR BLD AUTO: 22.5 % (ref 41–52)
HCT VFR BLD AUTO: 22.5 % (ref 41–52)
HCT VFR BLD AUTO: 22.6 % (ref 41–52)
HCT VFR BLD AUTO: 22.9 % (ref 41–52)
HCT VFR BLD AUTO: 22.9 % (ref 41–52)
HCT VFR BLD AUTO: 23.5 % (ref 41–52)
HCT VFR BLD AUTO: 23.5 % (ref 41–52)
HCT VFR BLD AUTO: 24 % (ref 41–52)
HCT VFR BLD AUTO: 24.1 % (ref 41–52)
HCT VFR BLD AUTO: 24.3 % (ref 41–52)
HCT VFR BLD AUTO: 24.3 % (ref 41–52)
HCT VFR BLD AUTO: 24.6 % (ref 41–52)
HCT VFR BLD AUTO: 24.9 % (ref 41–52)
HCT VFR BLD AUTO: 25.6 % (ref 41–52)
HCT VFR BLD AUTO: 25.7 % (ref 41–52)
HCT VFR BLD AUTO: 26.1 % (ref 41–52)
HCT VFR BLD AUTO: 26.7 % (ref 41–52)
HCT VFR BLD AUTO: 26.9 % (ref 41–52)
HCT VFR BLD AUTO: 29.1 % (ref 41–52)
HCT VFR BLD AUTO: 38.4 % (ref 41–52)
HCT VFR BLD AUTO: 42.8 % (ref 41–52)
HCT VFR BLD AUTO: 48.2 % (ref 41–52)
HCT VFR BLD AUTO: 48.8 % (ref 41–52)
HEPARIN AB PPP QL PL AGG: NEGATIVE
HEPARIN AB PPP QL PL AGG: NEGATIVE
HGB BLD-MCNC: 10.3 G/DL (ref 13.5–17.5)
HGB BLD-MCNC: 11 G/DL (ref 13.5–17.5)
HGB BLD-MCNC: 12.5 G/DL (ref 13.5–17.5)
HGB BLD-MCNC: 13.2 G/DL (ref 13.5–17.5)
HGB BLD-MCNC: 13.9 G/DL (ref 13.5–17.5)
HGB BLD-MCNC: 13.9 G/DL (ref 13.5–17.5)
HGB BLD-MCNC: 14.8 G/DL (ref 13.5–17.5)
HGB BLD-MCNC: 15.7 G/DL (ref 13.5–17.5)
HGB BLD-MCNC: 15.9 G/DL (ref 13.5–17.5)
HGB BLD-MCNC: 5.3 G/DL (ref 13.5–17.5)
HGB BLD-MCNC: 5.9 G/DL (ref 13.5–17.5)
HGB BLD-MCNC: 6 G/DL (ref 13.5–17.5)
HGB BLD-MCNC: 6.3 G/DL (ref 13.5–17.5)
HGB BLD-MCNC: 6.4 G/DL (ref 13.5–17.5)
HGB BLD-MCNC: 6.5 G/DL (ref 13.5–17.5)
HGB BLD-MCNC: 6.6 G/DL (ref 13.5–17.5)
HGB BLD-MCNC: 6.7 G/DL (ref 13.5–17.5)
HGB BLD-MCNC: 6.7 G/DL (ref 13.5–17.5)
HGB BLD-MCNC: 6.8 G/DL (ref 13.5–17.5)
HGB BLD-MCNC: 6.8 G/DL (ref 13.5–17.5)
HGB BLD-MCNC: 6.9 G/DL (ref 13.5–17.5)
HGB BLD-MCNC: 7 G/DL (ref 13.5–17.5)
HGB BLD-MCNC: 7 G/DL (ref 13.5–17.5)
HGB BLD-MCNC: 7.1 G/DL (ref 13.5–17.5)
HGB BLD-MCNC: 7.2 G/DL (ref 13.5–17.5)
HGB BLD-MCNC: 7.5 G/DL (ref 13.5–17.5)
HGB BLD-MCNC: 7.6 G/DL (ref 13.5–17.5)
HGB BLD-MCNC: 7.7 G/DL (ref 13.5–17.5)
HGB BLD-MCNC: 7.8 G/DL (ref 13.5–17.5)
HGB BLD-MCNC: 7.8 G/DL (ref 13.5–17.5)
HGB BLD-MCNC: 7.9 G/DL (ref 13.5–17.5)
HGB BLD-MCNC: 7.9 G/DL (ref 13.5–17.5)
HGB BLD-MCNC: 8 G/DL (ref 13.5–17.5)
HGB BLD-MCNC: 8 G/DL (ref 13.5–17.5)
HGB BLD-MCNC: 8.2 G/DL (ref 13.5–17.5)
HGB BLD-MCNC: 8.5 G/DL (ref 13.5–17.5)
HGB BLD-MCNC: 8.6 G/DL (ref 13.5–17.5)
HGB BLD-MCNC: 9.7 G/DL (ref 13.5–17.5)
HGB BLD-MCNC: 9.9 G/DL (ref 13.5–17.5)
HGB UR QL STRIP.AUTO: NEGATIVE
IMMUNOGLOBULIN G SUBCLASS 1: 615 MG/DL
IMMUNOGLOBULIN G SUBCLASS 2: 481 MG/DL
IMMUNOGLOBULIN G SUBCLASS 3: 31 MG/DL
IMMUNOGLOBULIN G SUBCLASS 4: 83 MG/DL
INR BLD: 1.1 (ref 0.9–1.2)
INR BLD: 1.2 (ref 0.9–1.2)
INR BLD: 1.3 (ref 0.9–1.2)
INR BLD: 1.3 (ref 0.9–1.2)
INR BLD: 1.4 (ref 0.9–1.2)
INR BLD: 1.6 (ref 0.9–1.2)
LACTATE SERPL-SCNC: 1.4 MMOL/L (ref 0.5–2.2)
LACTATE SERPL-SCNC: 2 MMOL/L (ref 0.5–2.2)
LACTATE SERPL-SCNC: 2 MMOL/L (ref 0.5–2.2)
LACTATE SERPL-SCNC: 2.2 MMOL/L (ref 0.5–2.2)
LACTATE SERPL-SCNC: 3.3 MMOL/L (ref 0.5–2.2)
LACTATE SERPL-SCNC: 4.6 MMOL/L (ref 0.5–2.2)
LACTATE SERPL-SCNC: 4.7 MMOL/L (ref 0.5–2.2)
LACTATE SERPL-SCNC: 5.1 MMOL/L (ref 0.5–2.2)
LACTATE SERPL-SCNC: 7.1 MMOL/L (ref 0.5–2.2)
LACTATE SERPL-SCNC: 8.6 MMOL/L (ref 0.5–2.2)
LDH SERPL L TO P-CCNC: 1104 U/L (ref 98–192)
LDH SERPL L TO P-CCNC: 1192 U/L (ref 98–192)
LDH SERPL L TO P-CCNC: 1295 U/L (ref 98–192)
LDH SERPL L TO P-CCNC: 1462 U/L (ref 98–192)
LDH SERPL L TO P-CCNC: 1507 U/L (ref 98–192)
LDH SERPL L TO P-CCNC: 2185 U/L (ref 98–192)
LDH SERPL L TO P-CCNC: 623 U/L (ref 98–192)
LDH SERPL L TO P-CCNC: 687 U/L (ref 98–192)
LEUKOCYTE ESTERASE UR QL STRIP.AUTO: NEGATIVE
LIPASE SERPL-CCNC: 103 U/L (ref 22–51)
LIPASE SERPL-CCNC: 105 U/L (ref 22–51)
LIPASE SERPL-CCNC: 1459 U/L (ref 22–51)
LIPASE SERPL-CCNC: 211 U/L (ref 22–51)
LIPASE SERPL-CCNC: 57 U/L (ref 22–51)
LIPASE SERPL-CCNC: 575 U/L (ref 22–51)
LIPASE SERPL-CCNC: 73 U/L (ref 22–51)
LIPASE SERPL-CCNC: 8316 U/L (ref 22–51)
LYMPHOCYTES # BLD: 0.6 K/UL (ref 1–4)
LYMPHOCYTES # BLD: 0.6 K/UL (ref 1–4)
LYMPHOCYTES # BLD: 0.8 K/UL (ref 1–4)
LYMPHOCYTES # BLD: 0.9 K/UL (ref 1–4)
LYMPHOCYTES # BLD: 1.2 K/UL (ref 1–4)
LYMPHOCYTES # BLD: 1.2 K/UL (ref 1–4)
LYMPHOCYTES # BLD: 1.4 K/UL (ref 1–4)
LYMPHOCYTES # BLD: 1.7 K/UL (ref 1–4)
LYMPHOCYTES # BLD: 1.9 K/UL (ref 1–4)
LYMPHOCYTES # BLD: 1.9 K/UL (ref 1–4)
LYMPHOCYTES # BLD: 2 K/UL (ref 1–4)
LYMPHOCYTES # BLD: 2.3 K/UL (ref 1–4)
LYMPHOCYTES # BLD: 2.6 K/UL (ref 1–4)
LYMPHOCYTES # BLD: 2.8 K/UL (ref 1–4)
LYMPHOCYTES # BLD: 3 K/UL (ref 1–4)
LYMPHOCYTES # BLD: 3.8 K/UL (ref 1–4)
LYMPHOCYTES # BLD: 4 K/UL (ref 1–4)
LYMPHOCYTES # BLD: 4.8 K/UL (ref 1–4)
LYMPHOCYTES # BLD: 5.2 K/UL (ref 1–4)
LYMPHOCYTES NFR BLD: 10 %
LYMPHOCYTES NFR BLD: 10 %
LYMPHOCYTES NFR BLD: 11 %
LYMPHOCYTES NFR BLD: 12 %
LYMPHOCYTES NFR BLD: 21 %
LYMPHOCYTES NFR BLD: 3 %
LYMPHOCYTES NFR BLD: 3 %
LYMPHOCYTES NFR BLD: 4 %
LYMPHOCYTES NFR BLD: 6 %
LYMPHOCYTES NFR BLD: 6 %
LYMPHOCYTES NFR BLD: 7 %
LYMPHOCYTES NFR BLD: 7 %
LYMPHOCYTES NFR BLD: 8 %
LYMPHOCYTES NFR BLD: 8 %
LYMPHOCYTES NFR BLD: 9 %
MAGNESIUM SERPL-MCNC: 1.7 MG/DL (ref 1.8–2.5)
MAGNESIUM SERPL-MCNC: 1.9 MG/DL (ref 1.8–2.5)
MAGNESIUM SERPL-MCNC: 2.2 MG/DL (ref 1.8–2.5)
MAGNESIUM SERPL-MCNC: 2.2 MG/DL (ref 1.8–2.5)
MAGNESIUM SERPL-MCNC: 2.3 MG/DL (ref 1.8–2.5)
MAGNESIUM SERPL-MCNC: 2.4 MG/DL (ref 1.8–2.5)
MAGNESIUM SERPL-MCNC: 2.5 MG/DL (ref 1.8–2.5)
MAGNESIUM SERPL-MCNC: 2.5 MG/DL (ref 1.8–2.5)
MAGNESIUM SERPL-MCNC: 2.6 MG/DL (ref 1.8–2.5)
MAGNESIUM SERPL-MCNC: 2.9 MG/DL (ref 1.8–2.5)
MCH RBC QN AUTO: 27.6 PG (ref 27–32)
MCH RBC QN AUTO: 28 PG (ref 27–32)
MCH RBC QN AUTO: 28 PG (ref 27–32)
MCH RBC QN AUTO: 28.1 PG (ref 27–32)
MCH RBC QN AUTO: 28.2 PG (ref 27–32)
MCH RBC QN AUTO: 28.3 PG (ref 27–32)
MCH RBC QN AUTO: 28.4 PG (ref 27–32)
MCH RBC QN AUTO: 28.4 PG (ref 27–32)
MCH RBC QN AUTO: 28.5 PG (ref 27–32)
MCH RBC QN AUTO: 28.6 PG (ref 27–32)
MCH RBC QN AUTO: 28.7 PG (ref 27–32)
MCH RBC QN AUTO: 29.1 PG (ref 27–32)
MCH RBC QN AUTO: 29.3 PG (ref 27–32)
MCH RBC QN AUTO: 29.5 PG (ref 27–32)
MCH RBC QN AUTO: 29.9 PG (ref 27–32)
MCH RBC QN AUTO: 30 PG (ref 27–32)
MCH RBC QN AUTO: 30.2 PG (ref 27–32)
MCH RBC QN AUTO: 30.2 PG (ref 27–32)
MCH RBC QN AUTO: 30.4 PG (ref 27–32)
MCH RBC QN AUTO: 30.5 PG (ref 27–32)
MCH RBC QN AUTO: 30.6 PG (ref 27–32)
MCH RBC QN AUTO: 30.7 PG (ref 27–32)
MCHC RBC AUTO-ENTMCNC: 30.2 G/DL (ref 32–37)
MCHC RBC AUTO-ENTMCNC: 30.9 G/DL (ref 32–37)
MCHC RBC AUTO-ENTMCNC: 31.2 G/DL (ref 32–37)
MCHC RBC AUTO-ENTMCNC: 31.3 G/DL (ref 32–37)
MCHC RBC AUTO-ENTMCNC: 31.3 G/DL (ref 32–37)
MCHC RBC AUTO-ENTMCNC: 31.5 G/DL (ref 32–37)
MCHC RBC AUTO-ENTMCNC: 31.5 G/DL (ref 32–37)
MCHC RBC AUTO-ENTMCNC: 31.7 G/DL (ref 32–37)
MCHC RBC AUTO-ENTMCNC: 31.9 G/DL (ref 32–37)
MCHC RBC AUTO-ENTMCNC: 32 G/DL (ref 32–37)
MCHC RBC AUTO-ENTMCNC: 32.2 G/DL (ref 32–37)
MCHC RBC AUTO-ENTMCNC: 32.4 G/DL (ref 32–37)
MCHC RBC AUTO-ENTMCNC: 32.5 G/DL (ref 32–37)
MCHC RBC AUTO-ENTMCNC: 32.6 G/DL (ref 32–37)
MCHC RBC AUTO-ENTMCNC: 32.7 G/DL (ref 32–37)
MCHC RBC AUTO-ENTMCNC: 32.8 G/DL (ref 32–37)
MCHC RBC AUTO-ENTMCNC: 33 G/DL (ref 32–37)
MCHC RBC AUTO-ENTMCNC: 33.2 G/DL (ref 32–37)
MCV RBC AUTO: 84.5 FL (ref 80–100)
MCV RBC AUTO: 85.6 FL (ref 80–100)
MCV RBC AUTO: 86.1 FL (ref 80–100)
MCV RBC AUTO: 86.3 FL (ref 80–100)
MCV RBC AUTO: 86.5 FL (ref 80–100)
MCV RBC AUTO: 86.9 FL (ref 80–100)
MCV RBC AUTO: 87 FL (ref 80–100)
MCV RBC AUTO: 87.5 FL (ref 80–100)
MCV RBC AUTO: 88.5 FL (ref 80–100)
MCV RBC AUTO: 88.6 FL (ref 80–100)
MCV RBC AUTO: 88.7 FL (ref 80–100)
MCV RBC AUTO: 89.8 FL (ref 80–100)
MCV RBC AUTO: 90.3 FL (ref 80–100)
MCV RBC AUTO: 90.6 FL (ref 80–100)
MCV RBC AUTO: 91 FL (ref 80–100)
MCV RBC AUTO: 92.2 FL (ref 80–100)
MCV RBC AUTO: 93 FL (ref 80–100)
MCV RBC AUTO: 94.6 FL (ref 80–100)
MCV RBC AUTO: 95.6 FL (ref 80–100)
MCV RBC AUTO: 95.7 FL (ref 80–100)
MCV RBC AUTO: 95.9 FL (ref 80–100)
MCV RBC AUTO: 96.3 FL (ref 80–100)
MCV RBC AUTO: 96.5 FL (ref 80–100)
MCV RBC AUTO: 98.2 FL (ref 80–100)
MCV RBC AUTO: 98.8 FL (ref 80–100)
METAMYELOCYTES # BLD MANUAL: 0.14 K/UL
METAMYELOCYTES # BLD MANUAL: 0.23 K/UL
METAMYELOCYTES # BLD MANUAL: 0.29 K/UL
METAMYELOCYTES # BLD MANUAL: 0.33 K/UL
METAMYELOCYTES # BLD MANUAL: 0.42 K/UL
METAMYELOCYTES # BLD MANUAL: 0.46 K/UL
METAMYELOCYTES # BLD MANUAL: 0.52 K/UL
METAMYELOCYTES # BLD MANUAL: 0.69 K/UL
METAMYELOCYTES # BLD MANUAL: 0.73 K/UL
METAMYELOCYTES # BLD MANUAL: 0.81 K/UL
METAMYELOCYTES # BLD MANUAL: 0.86 K/UL
METAMYELOCYTES # BLD MANUAL: 0.87 K/UL
METAMYELOCYTES # BLD MANUAL: 0.94 K/UL
METAMYELOCYTES # BLD MANUAL: 0.95 K/UL
METAMYELOCYTES # BLD MANUAL: 1.06 K/UL
METAMYELOCYTES # BLD MANUAL: 1.06 K/UL
METAMYELOCYTES # BLD MANUAL: 1.1 K/UL
METAMYELOCYTES # BLD MANUAL: 1.56 K/UL
METAMYELOCYTES # BLD MANUAL: 1.7 K/UL
METAMYELOCYTES # BLD MANUAL: 1.83 K/UL
METAMYELOCYTES # BLD MANUAL: 2.01 K/UL
METAMYELOCYTES # BLD MANUAL: 2.11 K/UL
METAMYELOCYTES # BLD MANUAL: 2.33 K/UL
METAMYELOCYTES # BLD MANUAL: 2.88 K/UL
METAMYELOCYTES # BLD MANUAL: 2.88 K/UL
METAMYELOCYTES # BLD MANUAL: 4.61 K/UL
METAMYELOCYTES # BLD MANUAL: 4.75 K/UL
METAMYELOCYTES NFR BLD: 1 %
METAMYELOCYTES NFR BLD: 1 %
METAMYELOCYTES NFR BLD: 10 %
METAMYELOCYTES NFR BLD: 16 %
METAMYELOCYTES NFR BLD: 2 %
METAMYELOCYTES NFR BLD: 3 %
METAMYELOCYTES NFR BLD: 4 %
METAMYELOCYTES NFR BLD: 5 %
METAMYELOCYTES NFR BLD: 6 %
METAMYELOCYTES NFR BLD: 7 %
METAMYELOCYTES NFR BLD: 7 %
METHGB MFR BLD: 1.6 % SAT (ref 0.4–1.5)
METHGB MFR BLD: 2 % SAT (ref 0.4–1.5)
METHGB MFR BLD: 3.8 % SAT (ref 0.4–1.5)
MODIFIED ALLEN TEST: POSITIVE
MONOCYTES # BLD: 0.4 K/UL (ref 0–1)
MONOCYTES # BLD: 0.9 K/UL (ref 0–1)
MONOCYTES # BLD: 1 K/UL (ref 0–1)
MONOCYTES # BLD: 1 K/UL (ref 0–1)
MONOCYTES # BLD: 1.2 K/UL (ref 0–1)
MONOCYTES # BLD: 1.3 K/UL (ref 0–1)
MONOCYTES # BLD: 1.4 K/UL (ref 0–1)
MONOCYTES # BLD: 1.4 K/UL (ref 0–1)
MONOCYTES # BLD: 1.6 K/UL (ref 0–1)
MONOCYTES # BLD: 1.7 K/UL (ref 0–1)
MONOCYTES # BLD: 1.8 K/UL (ref 0–1)
MONOCYTES # BLD: 2 K/UL (ref 0–1)
MONOCYTES # BLD: 2.1 K/UL (ref 0–1)
MONOCYTES # BLD: 2.2 K/UL (ref 0–1)
MONOCYTES # BLD: 2.4 K/UL (ref 0–1)
MONOCYTES # BLD: 2.9 K/UL (ref 0–1)
MONOCYTES # BLD: 2.9 K/UL (ref 0–1)
MONOCYTES # BLD: 3.4 K/UL (ref 0–1)
MONOCYTES # BLD: 3.5 K/UL (ref 0–1)
MONOCYTES NFR BLD: 10 %
MONOCYTES NFR BLD: 10 %
MONOCYTES NFR BLD: 12 %
MONOCYTES NFR BLD: 14 %
MONOCYTES NFR BLD: 3 %
MONOCYTES NFR BLD: 5 %
MONOCYTES NFR BLD: 6 %
MONOCYTES NFR BLD: 8 %
MONOCYTES NFR BLD: 9 %
MRSA DNA SPEC QL NAA+PROBE: NEGATIVE
MYELOCYTES NFR BLD: 1 %
MYELOCYTES NFR BLD: 2 %
MYELOCYTES NFR BLD: 3 %
MYELOCYTES NFR BLD: 6 %
MYELOCYTES NFR BLD: 7 %
NEUTROPHILS # BLD AUTO: 11.1 K/UL (ref 1.8–7.7)
NEUTROPHILS # BLD AUTO: 11.4 K/UL (ref 1.8–7.7)
NEUTROPHILS # BLD AUTO: 13.5 K/UL (ref 1.8–7.7)
NEUTROPHILS # BLD AUTO: 14.5 K/UL (ref 1.8–7.7)
NEUTROPHILS # BLD AUTO: 15.1 K/UL (ref 1.8–7.7)
NEUTROPHILS # BLD AUTO: 16.3 K/UL (ref 1.8–7.7)
NEUTROPHILS # BLD AUTO: 18.5 K/UL (ref 1.8–7.7)
NEUTROPHILS # BLD AUTO: 19.9 K/UL (ref 1.8–7.7)
NEUTROPHILS # BLD AUTO: 23.3 K/UL (ref 1.8–7.7)
NEUTROPHILS # BLD AUTO: 24.1 K/UL (ref 1.8–7.7)
NEUTROPHILS # BLD AUTO: 26.4 K/UL (ref 1.8–7.7)
NEUTROPHILS # BLD AUTO: 26.7 K/UL (ref 1.8–7.7)
NEUTROPHILS # BLD AUTO: 29.4 K/UL (ref 1.8–7.7)
NEUTROPHILS # BLD AUTO: 29.8 K/UL (ref 1.8–7.7)
NEUTROPHILS # BLD AUTO: 32.5 K/UL (ref 1.8–7.7)
NEUTROPHILS # BLD AUTO: 33.7 K/UL (ref 1.8–7.7)
NEUTROPHILS # BLD AUTO: 35.1 K/UL (ref 1.8–7.7)
NEUTROPHILS # BLD AUTO: 35.6 K/UL (ref 1.8–7.7)
NEUTROPHILS # BLD AUTO: 8.4 K/UL (ref 1.8–7.7)
NEUTROPHILS NFR BLD: 39 %
NEUTROPHILS NFR BLD: 47 %
NEUTROPHILS NFR BLD: 48 %
NEUTROPHILS NFR BLD: 51 %
NEUTROPHILS NFR BLD: 51 %
NEUTROPHILS NFR BLD: 53 %
NEUTROPHILS NFR BLD: 54 %
NEUTROPHILS NFR BLD: 54 %
NEUTROPHILS NFR BLD: 55 %
NEUTROPHILS NFR BLD: 57 %
NEUTROPHILS NFR BLD: 59 %
NEUTROPHILS NFR BLD: 62 %
NEUTROPHILS NFR BLD: 63 %
NEUTROPHILS NFR BLD: 64 %
NEUTROPHILS NFR BLD: 65 %
NEUTROPHILS NFR BLD: 65 %
NEUTROPHILS NFR BLD: 68 %
NEUTROPHILS NFR BLD: 70 %
NEUTROPHILS NFR BLD: 88 %
NEUTS BAND NFR BLD: 12 %
NEUTS BAND NFR BLD: 12 %
NEUTS BAND NFR BLD: 16 %
NEUTS BAND NFR BLD: 18 %
NEUTS BAND NFR BLD: 19 %
NEUTS BAND NFR BLD: 19 %
NEUTS BAND NFR BLD: 21 %
NEUTS BAND NFR BLD: 22 %
NEUTS BAND NFR BLD: 23 %
NEUTS BAND NFR BLD: 26 %
NEUTS BAND NFR BLD: 30 %
NEUTS BAND NFR BLD: 30 %
NEUTS BAND NFR BLD: 32 %
NEUTS BAND NFR BLD: 34 %
NEUTS BAND NFR BLD: 4 %
NEUTS BAND NFR BLD: 5 %
NITRITE UR QL STRIP.AUTO: NEGATIVE
NRBC BLD-RTO: 13 % (ref ?–1)
NRBC BLD-RTO: 14 % (ref ?–1)
NRBC BLD-RTO: 2 % (ref ?–1)
NRBC BLD-RTO: 20 % (ref ?–1)
NRBC BLD-RTO: 20 % (ref ?–1)
NRBC BLD-RTO: 21 % (ref ?–1)
NRBC BLD-RTO: 23 % (ref ?–1)
NRBC BLD-RTO: 23 % (ref ?–1)
NRBC BLD-RTO: 27 % (ref ?–1)
NRBC BLD-RTO: 36 % (ref ?–1)
NRBC BLD-RTO: 47 % (ref ?–1)
NRBC BLD-RTO: 50 % (ref ?–1)
NRBC BLD-RTO: 6 % (ref ?–1)
NRBC BLD-RTO: 70 % (ref ?–1)
NRBC BLD-RTO: 8 % (ref ?–1)
O2 CT BLD-SCNC: 10.4 VOL% (ref 15–23)
O2 CT BLD-SCNC: 14.2 VOL% (ref 15–23)
O2 CT BLD-SCNC: 14.3 VOL% (ref 15–23)
O2 CT BLD-SCNC: 15.1 VOL% (ref 15–23)
O2 CT BLD-SCNC: 15.2 VOL% (ref 15–23)
O2 CT BLD-SCNC: 7.2 VOL% (ref 15–23)
O2 CT BLD-SCNC: 7.7 VOL% (ref 15–23)
O2 CT BLD-SCNC: 8.7 VOL% (ref 15–23)
O2 CT BLD-SCNC: 9.4 VOL% (ref 15–23)
O2/TOTAL GAS SETTING VFR VENT: 100 %
O2/TOTAL GAS SETTING VFR VENT: 40 %
O2/TOTAL GAS SETTING VFR VENT: 50 %
O2/TOTAL GAS SETTING VFR VENT: 60 %
OSMOLALITY UR CALC.SUM OF ELEC: 284 MOSM/KG (ref 275–295)
OSMOLALITY UR CALC.SUM OF ELEC: 287 MOSM/KG (ref 275–295)
OSMOLALITY UR CALC.SUM OF ELEC: 293 MOSM/KG (ref 275–295)
OSMOLALITY UR CALC.SUM OF ELEC: 294 MOSM/KG (ref 275–295)
OSMOLALITY UR CALC.SUM OF ELEC: 298 MOSM/KG (ref 275–295)
OSMOLALITY UR CALC.SUM OF ELEC: 298 MOSM/KG (ref 275–295)
OSMOLALITY UR CALC.SUM OF ELEC: 306 MOSM/KG (ref 275–295)
OSMOLALITY UR CALC.SUM OF ELEC: 307 MOSM/KG (ref 275–295)
OSMOLALITY UR CALC.SUM OF ELEC: 307 MOSM/KG (ref 275–295)
OSMOLALITY UR CALC.SUM OF ELEC: 309 MOSM/KG (ref 275–295)
OSMOLALITY UR CALC.SUM OF ELEC: 310 MOSM/KG (ref 275–295)
OSMOLALITY UR CALC.SUM OF ELEC: 311 MOSM/KG (ref 275–295)
OSMOLALITY UR CALC.SUM OF ELEC: 312 MOSM/KG (ref 275–295)
OSMOLALITY UR CALC.SUM OF ELEC: 312 MOSM/KG (ref 275–295)
OSMOLALITY UR CALC.SUM OF ELEC: 314 MOSM/KG (ref 275–295)
OSMOLALITY UR CALC.SUM OF ELEC: 314 MOSM/KG (ref 275–295)
OSMOLALITY UR CALC.SUM OF ELEC: 315 MOSM/KG (ref 275–295)
OSMOLALITY UR CALC.SUM OF ELEC: 316 MOSM/KG (ref 275–295)
OSMOLALITY UR CALC.SUM OF ELEC: 317 MOSM/KG (ref 275–295)
OSMOLALITY UR CALC.SUM OF ELEC: 318 MOSM/KG (ref 275–295)
OSMOLALITY UR CALC.SUM OF ELEC: 319 MOSM/KG (ref 275–295)
OSMOLALITY UR CALC.SUM OF ELEC: 320 MOSM/KG (ref 275–295)
OSMOLALITY UR CALC.SUM OF ELEC: 321 MOSM/KG (ref 275–295)
OSMOLALITY UR CALC.SUM OF ELEC: 321 MOSM/KG (ref 275–295)
OSMOLALITY UR CALC.SUM OF ELEC: 322 MOSM/KG (ref 275–295)
OSMOLALITY UR CALC.SUM OF ELEC: 325 MOSM/KG (ref 275–295)
OSMOLALITY UR CALC.SUM OF ELEC: 326 MOSM/KG (ref 275–295)
OSMOLALITY UR CALC.SUM OF ELEC: 327 MOSM/KG (ref 275–295)
OSMOLALITY UR CALC.SUM OF ELEC: 328 MOSM/KG (ref 275–295)
OSMOLALITY UR CALC.SUM OF ELEC: 330 MOSM/KG (ref 275–295)
OSMOLALITY UR CALC.SUM OF ELEC: 331 MOSM/KG (ref 275–295)
OSMOLALITY UR CALC.SUM OF ELEC: 339 MOSM/KG (ref 275–295)
PCO2 BLDA: 29 MM HG (ref 35–45)
PCO2 BLDA: 37 MM HG (ref 35–45)
PCO2 BLDA: 37 MM HG (ref 35–45)
PCO2 BLDA: 38 MM HG (ref 35–45)
PCO2 BLDA: 40 MM HG (ref 35–45)
PCO2 BLDA: 44 MM HG (ref 35–45)
PCO2 BLDA: 45 MM HG (ref 35–45)
PCO2 BLDA: 49 MM HG (ref 35–45)
PCO2 BLDA: 49 MM HG (ref 35–45)
PEEP SETTING VENT: 5 CM H2O
PEEP SETTING VENT: 8 CM H2O
PH BLDA: 7.2 [PH] (ref 7.35–7.45)
PH BLDA: 7.26 [PH] (ref 7.35–7.45)
PH BLDA: 7.27 [PH] (ref 7.35–7.45)
PH BLDA: 7.31 [PH] (ref 7.35–7.45)
PH BLDA: 7.33 [PH] (ref 7.35–7.45)
PH BLDA: 7.36 [PH] (ref 7.35–7.45)
PH BLDA: 7.36 [PH] (ref 7.35–7.45)
PH BLDA: 7.4 [PH] (ref 7.35–7.45)
PH BLDA: 7.41 [PH] (ref 7.35–7.45)
PH UR: 6 [PH] (ref 5–8)
PHOSPHATE SERPL-MCNC: 10.4 MG/DL (ref 2.4–4.7)
PHOSPHATE SERPL-MCNC: 10.6 MG/DL (ref 2.4–4.7)
PHOSPHATE SERPL-MCNC: 11 MG/DL (ref 2.4–4.7)
PHOSPHATE SERPL-MCNC: 2 MG/DL (ref 2.4–4.7)
PHOSPHATE SERPL-MCNC: 2.4 MG/DL (ref 2.4–4.7)
PHOSPHATE SERPL-MCNC: 2.6 MG/DL (ref 2.4–4.7)
PHOSPHATE SERPL-MCNC: 3.1 MG/DL (ref 2.4–4.7)
PHOSPHATE SERPL-MCNC: 3.3 MG/DL (ref 2.4–4.7)
PHOSPHATE SERPL-MCNC: 3.5 MG/DL (ref 2.4–4.7)
PHOSPHATE SERPL-MCNC: 3.8 MG/DL (ref 2.4–4.7)
PHOSPHATE SERPL-MCNC: 4 MG/DL (ref 2.4–4.7)
PHOSPHATE SERPL-MCNC: 4.2 MG/DL (ref 2.4–4.7)
PHOSPHATE SERPL-MCNC: 4.5 MG/DL (ref 2.4–4.7)
PHOSPHATE SERPL-MCNC: 5.5 MG/DL (ref 2.4–4.7)
PHOSPHATE SERPL-MCNC: 5.8 MG/DL (ref 2.4–4.7)
PHOSPHATE SERPL-MCNC: 5.9 MG/DL (ref 2.4–4.7)
PHOSPHATE SERPL-MCNC: 5.9 MG/DL (ref 2.4–4.7)
PHOSPHATE SERPL-MCNC: 6.1 MG/DL (ref 2.4–4.7)
PHOSPHATE SERPL-MCNC: 6.3 MG/DL (ref 2.4–4.7)
PHOSPHATE SERPL-MCNC: 7.2 MG/DL (ref 2.4–4.7)
PHOSPHATE SERPL-MCNC: 7.8 MG/DL (ref 2.4–4.7)
PHOSPHATE SERPL-MCNC: 8.1 MG/DL (ref 2.4–4.7)
PHOSPHATE SERPL-MCNC: 8.3 MG/DL (ref 2.4–4.7)
PHOSPHATE SERPL-MCNC: 8.4 MG/DL (ref 2.4–4.7)
PHOSPHATE SERPL-MCNC: 8.4 MG/DL (ref 2.4–4.7)
PHOSPHATE SERPL-MCNC: 8.5 MG/DL (ref 2.4–4.7)
PHOSPHATE SERPL-MCNC: 8.6 MG/DL (ref 2.4–4.7)
PHOSPHATE SERPL-MCNC: 8.6 MG/DL (ref 2.4–4.7)
PHOSPHATE SERPL-MCNC: 8.7 MG/DL (ref 2.4–4.7)
PHOSPHATE SERPL-MCNC: 9 MG/DL (ref 2.4–4.7)
PHOSPHATE SERPL-MCNC: 9.3 MG/DL (ref 2.4–4.7)
PHOSPHATE SERPL-MCNC: 9.3 MG/DL (ref 2.4–4.7)
PLATELET # BLD AUTO: 101 K/UL (ref 140–400)
PLATELET # BLD AUTO: 109 K/UL (ref 140–400)
PLATELET # BLD AUTO: 109 K/UL (ref 140–400)
PLATELET # BLD AUTO: 116 K/UL (ref 140–400)
PLATELET # BLD AUTO: 121 K/UL (ref 140–400)
PLATELET # BLD AUTO: 132 K/UL (ref 140–400)
PLATELET # BLD AUTO: 158 K/UL (ref 140–400)
PLATELET # BLD AUTO: 161 K/UL (ref 140–400)
PLATELET # BLD AUTO: 174 K/UL (ref 140–400)
PLATELET # BLD AUTO: 174 K/UL (ref 140–400)
PLATELET # BLD AUTO: 181 K/UL (ref 140–400)
PLATELET # BLD AUTO: 181 K/UL (ref 140–400)
PLATELET # BLD AUTO: 216 K/UL (ref 140–400)
PLATELET # BLD AUTO: 219 K/UL (ref 140–400)
PLATELET # BLD AUTO: 221 K/UL (ref 140–400)
PLATELET # BLD AUTO: 239 K/UL (ref 140–400)
PLATELET # BLD AUTO: 40 K/UL (ref 140–400)
PLATELET # BLD AUTO: 45 K/UL (ref 140–400)
PLATELET # BLD AUTO: 57 K/UL (ref 140–400)
PLATELET # BLD AUTO: 58 K/UL (ref 140–400)
PLATELET # BLD AUTO: 62 K/UL (ref 140–400)
PLATELET # BLD AUTO: 68 K/UL (ref 140–400)
PLATELET # BLD AUTO: 79 K/UL (ref 140–400)
PLATELET # BLD AUTO: 84 K/UL (ref 140–400)
PLATELET # BLD AUTO: 88 K/UL (ref 140–400)
PLATELET # BLD AUTO: 88 K/UL (ref 140–400)
PLATELET # BLD AUTO: 89 K/UL (ref 140–400)
PLATELET # BLD AUTO: 99 K/UL (ref 140–400)
PMV BLD AUTO: 10 FL (ref 7.4–10.3)
PMV BLD AUTO: 10.1 FL (ref 7.4–10.3)
PMV BLD AUTO: 10.3 FL (ref 7.4–10.3)
PMV BLD AUTO: 10.6 FL (ref 7.4–10.3)
PMV BLD AUTO: 8.7 FL (ref 7.4–10.3)
PMV BLD AUTO: 8.8 FL (ref 7.4–10.3)
PMV BLD AUTO: 8.9 FL (ref 7.4–10.3)
PMV BLD AUTO: 9 FL (ref 7.4–10.3)
PMV BLD AUTO: 9.2 FL (ref 7.4–10.3)
PMV BLD AUTO: 9.3 FL (ref 7.4–10.3)
PMV BLD AUTO: 9.6 FL (ref 7.4–10.3)
PMV BLD AUTO: 9.7 FL (ref 7.4–10.3)
PMV BLD AUTO: 9.7 FL (ref 7.4–10.3)
PMV BLD AUTO: 9.8 FL (ref 7.4–10.3)
PMV BLD AUTO: 9.8 FL (ref 7.4–10.3)
PO2 BLDA: 107 MM HG (ref 80–100)
PO2 BLDA: 167 MM HG (ref 80–100)
PO2 BLDA: 171 MM HG (ref 80–100)
PO2 BLDA: 227 MM HG (ref 80–100)
PO2 BLDA: 56 MM HG (ref 80–100)
PO2 BLDA: 75 MM HG (ref 80–100)
PO2 BLDA: 75 MM HG (ref 80–100)
PO2 BLDA: 77 MM HG (ref 80–100)
PO2 BLDA: 92 MM HG (ref 80–100)
PO2 SATN ADJ TO 0.5 BLD: 23 MMHG (ref 24–28)
PO2 SATN ADJ TO 0.5 BLD: 24 MMHG (ref 24–28)
PO2 SATN ADJ TO 0.5 BLD: 25 MMHG (ref 24–28)
PO2 SATN ADJ TO 0.5 BLD: 27 MMHG (ref 24–28)
PO2 SATN ADJ TO 0.5 BLD: 27 MMHG (ref 24–28)
PO2 SATN ADJ TO 0.5 BLD: 28 MMHG (ref 24–28)
PO2 SATN ADJ TO 0.5 BLD: 30 MMHG (ref 24–28)
PO2 SATN ADJ TO 0.5 BLD: 31 MMHG (ref 24–28)
PO2 SATN ADJ TO 0.5 BLD: 34 MMHG (ref 24–28)
POTASSIUM (BLOOD GAS): 4.9 MMOL/L (ref 3.3–5.1)
POTASSIUM (BLOOD GAS): 5.3 MMOL/L (ref 3.3–5.1)
POTASSIUM (BLOOD GAS): 6.3 MMOL/L (ref 3.3–5.1)
POTASSIUM SERPL-SCNC: 3.3 MMOL/L (ref 3.3–5.1)
POTASSIUM SERPL-SCNC: 4.3 MMOL/L (ref 3.3–5.1)
POTASSIUM SERPL-SCNC: 4.5 MMOL/L (ref 3.3–5.1)
POTASSIUM SERPL-SCNC: 4.5 MMOL/L (ref 3.3–5.1)
POTASSIUM SERPL-SCNC: 4.6 MMOL/L (ref 3.3–5.1)
POTASSIUM SERPL-SCNC: 4.7 MMOL/L (ref 3.3–5.1)
POTASSIUM SERPL-SCNC: 4.8 MMOL/L (ref 3.3–5.1)
POTASSIUM SERPL-SCNC: 4.9 MMOL/L (ref 3.3–5.1)
POTASSIUM SERPL-SCNC: 5 MMOL/L (ref 3.3–5.1)
POTASSIUM SERPL-SCNC: 5.1 MMOL/L (ref 3.3–5.1)
POTASSIUM SERPL-SCNC: 5.1 MMOL/L (ref 3.3–5.1)
POTASSIUM SERPL-SCNC: 5.2 MMOL/L (ref 3.3–5.1)
POTASSIUM SERPL-SCNC: 5.3 MMOL/L (ref 3.3–5.1)
POTASSIUM SERPL-SCNC: 5.5 MMOL/L (ref 3.3–5.1)
POTASSIUM SERPL-SCNC: 5.5 MMOL/L (ref 3.3–5.1)
POTASSIUM SERPL-SCNC: 5.6 MMOL/L (ref 3.3–5.1)
POTASSIUM SERPL-SCNC: 5.7 MMOL/L (ref 3.3–5.1)
POTASSIUM SERPL-SCNC: 5.8 MMOL/L (ref 3.3–5.1)
POTASSIUM SERPL-SCNC: 5.9 MMOL/L (ref 3.3–5.1)
POTASSIUM SERPL-SCNC: 6 MMOL/L (ref 3.3–5.1)
POTASSIUM SERPL-SCNC: 6 MMOL/L (ref 3.3–5.1)
POTASSIUM SERPL-SCNC: 6.4 MMOL/L (ref 3.3–5.1)
PREALB SERPL-MCNC: 6.7 MG/DL (ref 17.1–100)
PROMYELOCYTES # BLD MANUAL: 0.35 K/UL
PROMYELOCYTES NFR BLD: 1 %
PROT SERPL-MCNC: 4.9 G/DL (ref 5.9–8.4)
PROT SERPL-MCNC: 5.7 G/DL (ref 5.9–8.4)
PROT SERPL-MCNC: 5.9 G/DL (ref 5.9–8.4)
PROT SERPL-MCNC: 5.9 G/DL (ref 5.9–8.4)
PROT SERPL-MCNC: 6.1 G/DL (ref 5.9–8.4)
PROT SERPL-MCNC: 6.3 G/DL (ref 5.9–8.4)
PROT SERPL-MCNC: 6.3 G/DL (ref 5.9–8.4)
PROT SERPL-MCNC: 6.4 G/DL (ref 5.9–8.4)
PROT SERPL-MCNC: 6.5 G/DL (ref 5.9–8.4)
PROT SERPL-MCNC: 6.5 G/DL (ref 5.9–8.4)
PROT SERPL-MCNC: 7.7 G/DL (ref 5.9–8.4)
PROT UR-MCNC: NEGATIVE MG/DL
PROTHROMBIN TIME: 14.3 SECONDS (ref 11.8–14.5)
PROTHROMBIN TIME: 14.7 SECONDS (ref 11.8–14.5)
PROTHROMBIN TIME: 14.8 SECONDS (ref 11.8–14.5)
PROTHROMBIN TIME: 15.1 SECONDS (ref 11.8–14.5)
PROTHROMBIN TIME: 15.3 SECONDS (ref 11.8–14.5)
PROTHROMBIN TIME: 15.6 SECONDS (ref 11.8–14.5)
PROTHROMBIN TIME: 15.7 SECONDS (ref 11.8–14.5)
PROTHROMBIN TIME: 16.3 SECONDS (ref 11.8–14.5)
PROTHROMBIN TIME: 17.1 SECONDS (ref 11.8–14.5)
PROTHROMBIN TIME: 17.1 SECONDS (ref 11.8–14.5)
PROTHROMBIN TIME: 18.5 SECONDS (ref 11.8–14.5)
PUNCTURE CHARGE: NO
PUNCTURE CHARGE: YES
RBC # BLD AUTO: 2.07 M/UL (ref 4.5–5.9)
RBC # BLD AUTO: 2.08 M/UL (ref 4.5–5.9)
RBC # BLD AUTO: 2.11 M/UL (ref 4.5–5.9)
RBC # BLD AUTO: 2.21 M/UL (ref 4.5–5.9)
RBC # BLD AUTO: 2.25 M/UL (ref 4.5–5.9)
RBC # BLD AUTO: 2.26 M/UL (ref 4.5–5.9)
RBC # BLD AUTO: 2.39 M/UL (ref 4.5–5.9)
RBC # BLD AUTO: 2.39 M/UL (ref 4.5–5.9)
RBC # BLD AUTO: 2.44 M/UL (ref 4.5–5.9)
RBC # BLD AUTO: 2.45 M/UL (ref 4.5–5.9)
RBC # BLD AUTO: 2.47 M/UL (ref 4.5–5.9)
RBC # BLD AUTO: 2.51 M/UL (ref 4.5–5.9)
RBC # BLD AUTO: 2.51 M/UL (ref 4.5–5.9)
RBC # BLD AUTO: 2.52 M/UL (ref 4.5–5.9)
RBC # BLD AUTO: 2.52 M/UL (ref 4.5–5.9)
RBC # BLD AUTO: 2.69 M/UL (ref 4.5–5.9)
RBC # BLD AUTO: 2.74 M/UL (ref 4.5–5.9)
RBC # BLD AUTO: 2.82 M/UL (ref 4.5–5.9)
RBC # BLD AUTO: 2.89 M/UL (ref 4.5–5.9)
RBC # BLD AUTO: 3.01 M/UL (ref 4.5–5.9)
RBC # BLD AUTO: 3.4 M/UL (ref 4.5–5.9)
RBC # BLD AUTO: 4.41 M/UL (ref 4.5–5.9)
RBC # BLD AUTO: 4.96 M/UL (ref 4.5–5.9)
RBC # BLD AUTO: 5.6 M/UL (ref 4.5–5.9)
RBC # BLD AUTO: 5.77 M/UL (ref 4.5–5.9)
RESP RATE: 14 BPM
RESP RATE: 18 BPM
RESP RATE: 20 BPM
RH BLOOD TYPE: POSITIVE
SAO2 % BLDA: 80.2 % (ref 94–100)
SAO2 % BLDA: 95.8 % (ref 94–100)
SAO2 % BLDA: 95.8 % (ref 94–100)
SAO2 % BLDA: 96.2 % (ref 94–100)
SAO2 % BLDA: 97.8 % (ref 94–100)
SAO2 % BLDA: >98.5 % (ref 94–100)
SODIUM (BLOOD GAS): 137 MMOL/L (ref 135–145)
SODIUM (BLOOD GAS): 138 MMOL/L (ref 135–145)
SODIUM (BLOOD GAS): 142 MMOL/L (ref 135–145)
SODIUM SERPL-SCNC: 131 MMOL/L (ref 136–144)
SODIUM SERPL-SCNC: 132 MMOL/L (ref 136–144)
SODIUM SERPL-SCNC: 134 MMOL/L (ref 136–144)
SODIUM SERPL-SCNC: 135 MMOL/L (ref 136–144)
SODIUM SERPL-SCNC: 136 MMOL/L (ref 136–144)
SODIUM SERPL-SCNC: 137 MMOL/L (ref 136–144)
SODIUM SERPL-SCNC: 138 MMOL/L (ref 136–144)
SODIUM SERPL-SCNC: 139 MMOL/L (ref 136–144)
SODIUM SERPL-SCNC: 140 MMOL/L (ref 136–144)
SODIUM SERPL-SCNC: 141 MMOL/L (ref 136–144)
SODIUM SERPL-SCNC: 142 MMOL/L (ref 136–144)
SODIUM SERPL-SCNC: 142 MMOL/L (ref 136–144)
SODIUM SERPL-SCNC: 144 MMOL/L (ref 136–144)
SODIUM UR-SCNC: <10 MMOL/L
SP GR UR STRIP: 1.03 (ref 1–1.03)
SPECIMEN VOL 24H UR: 500 ML
SPECIMEN VOL 24H UR: 600 ML
TRIGL SERPL-MCNC: 139 MG/DL (ref 1–149)
TRIGL SERPL-MCNC: 159 MG/DL (ref 1–149)
TRIGL SERPL-MCNC: 168 MG/DL (ref 1–149)
TRIGL SERPL-MCNC: 169 MG/DL (ref 1–149)
TRIGL SERPL-MCNC: 228 MG/DL (ref 1–149)
TRIGL SERPL-MCNC: 263 MG/DL (ref 1–149)
TRIGL SERPL-MCNC: 269 MG/DL (ref 1–149)
TROPONIN I SERPL-MCNC: 0 NG/ML (ref ?–0.03)
UROBILINOGEN UR STRIP-ACNC: <2
VARIANT LYMPHS NFR BLD MANUAL: 1 %
VARIANT LYMPHS NFR BLD MANUAL: 4 %
VIT C UR-MCNC: NEGATIVE MG/DL
WBC # BLD AUTO: 10.8 K/UL (ref 4–11)
WBC # BLD AUTO: 12.4 K/UL (ref 4–11)
WBC # BLD AUTO: 13.6 K/UL (ref 4–11)
WBC # BLD AUTO: 13.7 K/UL (ref 4–11)
WBC # BLD AUTO: 14.7 K/UL (ref 4–11)
WBC # BLD AUTO: 17.3 K/UL (ref 4–11)
WBC # BLD AUTO: 17.4 K/UL (ref 4–11)
WBC # BLD AUTO: 18.6 K/UL (ref 4–11)
WBC # BLD AUTO: 19 K/UL (ref 4–11)
WBC # BLD AUTO: 19.9 K/UL (ref 4–11)
WBC # BLD AUTO: 20.3 K/UL (ref 4–11)
WBC # BLD AUTO: 22.9 K/UL (ref 4–11)
WBC # BLD AUTO: 24.3 K/UL (ref 4–11)
WBC # BLD AUTO: 28.1 K/UL (ref 4–11)
WBC # BLD AUTO: 28.7 K/UL (ref 4–11)
WBC # BLD AUTO: 28.8 K/UL (ref 4–11)
WBC # BLD AUTO: 31.3 K/UL (ref 4–11)
WBC # BLD AUTO: 33.3 K/UL (ref 4–11)
WBC # BLD AUTO: 34.7 K/UL (ref 4–11)
WBC # BLD AUTO: 35.4 K/UL (ref 4–11)
WBC # BLD AUTO: 36.7 K/UL (ref 4–11)
WBC # BLD AUTO: 42.2 K/UL (ref 4–11)
WBC # BLD AUTO: 45 K/UL (ref 4–11)
WBC # BLD AUTO: 47.5 K/UL (ref 4–11)
WBC # BLD AUTO: 48 K/UL (ref 4–11)

## 2018-01-01 PROCEDURE — 82962 GLUCOSE BLOOD TEST: CPT

## 2018-01-01 PROCEDURE — 87075 CULTR BACTERIA EXCEPT BLOOD: CPT | Performed by: SURGERY

## 2018-01-01 PROCEDURE — 85014 HEMATOCRIT: CPT | Performed by: HOSPITALIST

## 2018-01-01 PROCEDURE — 84132 ASSAY OF SERUM POTASSIUM: CPT | Performed by: INTERNAL MEDICINE

## 2018-01-01 PROCEDURE — 05HM33Z INSERTION OF INFUSION DEVICE INTO RIGHT INTERNAL JUGULAR VEIN, PERCUTANEOUS APPROACH: ICD-10-PCS | Performed by: RADIOLOGY

## 2018-01-01 PROCEDURE — 85018 HEMOGLOBIN: CPT | Performed by: INTERNAL MEDICINE

## 2018-01-01 PROCEDURE — 36581 REPLACE TUNNELED CV CATH: CPT

## 2018-01-01 PROCEDURE — 85610 PROTHROMBIN TIME: CPT | Performed by: SURGERY

## 2018-01-01 PROCEDURE — 85049 AUTOMATED PLATELET COUNT: CPT | Performed by: SURGERY

## 2018-01-01 PROCEDURE — 36592 COLLECT BLOOD FROM PICC: CPT

## 2018-01-01 PROCEDURE — 90947 DIALYSIS REPEATED EVAL: CPT

## 2018-01-01 PROCEDURE — 85730 THROMBOPLASTIN TIME PARTIAL: CPT | Performed by: HOSPITALIST

## 2018-01-01 PROCEDURE — 36569 INSJ PICC 5 YR+ W/O IMAGING: CPT

## 2018-01-01 PROCEDURE — 86665 EPSTEIN-BARR CAPSID VCA: CPT | Performed by: INTERNAL MEDICINE

## 2018-01-01 PROCEDURE — 87070 CULTURE OTHR SPECIMN AEROBIC: CPT | Performed by: SURGERY

## 2018-01-01 PROCEDURE — 83735 ASSAY OF MAGNESIUM: CPT | Performed by: SURGERY

## 2018-01-01 PROCEDURE — 85007 BL SMEAR W/DIFF WBC COUNT: CPT | Performed by: SURGERY

## 2018-01-01 PROCEDURE — 85027 COMPLETE CBC AUTOMATED: CPT | Performed by: INTERNAL MEDICINE

## 2018-01-01 PROCEDURE — 85007 BL SMEAR W/DIFF WBC COUNT: CPT | Performed by: INTERNAL MEDICINE

## 2018-01-01 PROCEDURE — 87102 FUNGUS ISOLATION CULTURE: CPT | Performed by: SURGERY

## 2018-01-01 PROCEDURE — 36600 WITHDRAWAL OF ARTERIAL BLOOD: CPT | Performed by: INTERNAL MEDICINE

## 2018-01-01 PROCEDURE — 84134 ASSAY OF PREALBUMIN: CPT | Performed by: SURGERY

## 2018-01-01 PROCEDURE — 0DJ08ZZ INSPECTION OF UPPER INTESTINAL TRACT, VIA NATURAL OR ARTIFICIAL OPENING ENDOSCOPIC: ICD-10-PCS | Performed by: INTERNAL MEDICINE

## 2018-01-01 PROCEDURE — 82805 BLOOD GASES W/O2 SATURATION: CPT | Performed by: INTERNAL MEDICINE

## 2018-01-01 PROCEDURE — 36558 INSERT TUNNELED CV CATH: CPT

## 2018-01-01 PROCEDURE — 02HV33Z INSERTION OF INFUSION DEVICE INTO SUPERIOR VENA CAVA, PERCUTANEOUS APPROACH: ICD-10-PCS | Performed by: HOSPITALIST

## 2018-01-01 PROCEDURE — 86850 RBC ANTIBODY SCREEN: CPT | Performed by: SURGERY

## 2018-01-01 PROCEDURE — 85027 COMPLETE CBC AUTOMATED: CPT | Performed by: SURGERY

## 2018-01-01 PROCEDURE — C9113 INJ PANTOPRAZOLE SODIUM, VIA: HCPCS | Performed by: INTERNAL MEDICINE

## 2018-01-01 PROCEDURE — 94660 CPAP INITIATION&MGMT: CPT

## 2018-01-01 PROCEDURE — 86900 BLOOD TYPING SEROLOGIC ABO: CPT | Performed by: INTERNAL MEDICINE

## 2018-01-01 PROCEDURE — 86850 RBC ANTIBODY SCREEN: CPT | Performed by: INTERNAL MEDICINE

## 2018-01-01 PROCEDURE — 80069 RENAL FUNCTION PANEL: CPT | Performed by: INTERNAL MEDICINE

## 2018-01-01 PROCEDURE — 87116 MYCOBACTERIA CULTURE: CPT | Performed by: SURGERY

## 2018-01-01 PROCEDURE — 83735 ASSAY OF MAGNESIUM: CPT | Performed by: INTERNAL MEDICINE

## 2018-01-01 PROCEDURE — 83615 LACTATE (LD) (LDH) ENZYME: CPT | Performed by: SURGERY

## 2018-01-01 PROCEDURE — A4216 STERILE WATER/SALINE, 10 ML: HCPCS

## 2018-01-01 PROCEDURE — 85018 HEMOGLOBIN: CPT | Performed by: HOSPITALIST

## 2018-01-01 PROCEDURE — 84100 ASSAY OF PHOSPHORUS: CPT | Performed by: SURGERY

## 2018-01-01 PROCEDURE — A4216 STERILE WATER/SALINE, 10 ML: HCPCS | Performed by: INTERNAL MEDICINE

## 2018-01-01 PROCEDURE — 80500 HEPATITIS B PROFILE: CPT | Performed by: INTERNAL MEDICINE

## 2018-01-01 PROCEDURE — 36415 COLL VENOUS BLD VENIPUNCTURE: CPT

## 2018-01-01 PROCEDURE — 85025 COMPLETE CBC W/AUTO DIFF WBC: CPT | Performed by: HOSPITALIST

## 2018-01-01 PROCEDURE — A9576 INJ PROHANCE MULTIPACK: HCPCS | Performed by: INTERNAL MEDICINE

## 2018-01-01 PROCEDURE — 0BP1XDZ REMOVAL OF INTRALUMINAL DEVICE FROM TRACHEA, EXTERNAL APPROACH: ICD-10-PCS | Performed by: SURGERY

## 2018-01-01 PROCEDURE — 94003 VENT MGMT INPAT SUBQ DAY: CPT

## 2018-01-01 PROCEDURE — 84300 ASSAY OF URINE SODIUM: CPT | Performed by: INTERNAL MEDICINE

## 2018-01-01 PROCEDURE — 74018 RADEX ABDOMEN 1 VIEW: CPT | Performed by: INTERNAL MEDICINE

## 2018-01-01 PROCEDURE — 76705 ECHO EXAM OF ABDOMEN: CPT | Performed by: EMERGENCY MEDICINE

## 2018-01-01 PROCEDURE — 80048 BASIC METABOLIC PNL TOTAL CA: CPT | Performed by: INTERNAL MEDICINE

## 2018-01-01 PROCEDURE — 84100 ASSAY OF PHOSPHORUS: CPT | Performed by: INTERNAL MEDICINE

## 2018-01-01 PROCEDURE — 85390 FIBRINOLYSINS SCREEN I&R: CPT | Performed by: INTERNAL MEDICINE

## 2018-01-01 PROCEDURE — 85027 COMPLETE CBC AUTOMATED: CPT | Performed by: HOSPITALIST

## 2018-01-01 PROCEDURE — 85007 BL SMEAR W/DIFF WBC COUNT: CPT | Performed by: HOSPITALIST

## 2018-01-01 PROCEDURE — 83605 ASSAY OF LACTIC ACID: CPT | Performed by: INTERNAL MEDICINE

## 2018-01-01 PROCEDURE — 85014 HEMATOCRIT: CPT | Performed by: INTERNAL MEDICINE

## 2018-01-01 PROCEDURE — 85060 BLOOD SMEAR INTERPRETATION: CPT | Performed by: INTERNAL MEDICINE

## 2018-01-01 PROCEDURE — 85730 THROMBOPLASTIN TIME PARTIAL: CPT | Performed by: SURGERY

## 2018-01-01 PROCEDURE — 90945 DIALYSIS ONE EVALUATION: CPT

## 2018-01-01 PROCEDURE — 76937 US GUIDE VASCULAR ACCESS: CPT

## 2018-01-01 PROCEDURE — 84478 ASSAY OF TRIGLYCERIDES: CPT | Performed by: INTERNAL MEDICINE

## 2018-01-01 PROCEDURE — 83010 ASSAY OF HAPTOGLOBIN QUANT: CPT | Performed by: INTERNAL MEDICINE

## 2018-01-01 PROCEDURE — 86901 BLOOD TYPING SEROLOGIC RH(D): CPT | Performed by: INTERNAL MEDICINE

## 2018-01-01 PROCEDURE — 71045 X-RAY EXAM CHEST 1 VIEW: CPT | Performed by: INTERNAL MEDICINE

## 2018-01-01 PROCEDURE — 80053 COMPREHEN METABOLIC PANEL: CPT

## 2018-01-01 PROCEDURE — 99152 MOD SED SAME PHYS/QHP 5/>YRS: CPT | Performed by: INTERNAL MEDICINE

## 2018-01-01 PROCEDURE — 80076 HEPATIC FUNCTION PANEL: CPT | Performed by: SURGERY

## 2018-01-01 PROCEDURE — 80320 DRUG SCREEN QUANTALCOHOLS: CPT | Performed by: INTERNAL MEDICINE

## 2018-01-01 PROCEDURE — 80048 BASIC METABOLIC PNL TOTAL CA: CPT | Performed by: EMERGENCY MEDICINE

## 2018-01-01 PROCEDURE — 83690 ASSAY OF LIPASE: CPT | Performed by: SURGERY

## 2018-01-01 PROCEDURE — 71045 X-RAY EXAM CHEST 1 VIEW: CPT | Performed by: RADIOLOGY

## 2018-01-01 PROCEDURE — A4216 STERILE WATER/SALINE, 10 ML: HCPCS | Performed by: HOSPITALIST

## 2018-01-01 PROCEDURE — 74181 MRI ABDOMEN W/O CONTRAST: CPT | Performed by: INTERNAL MEDICINE

## 2018-01-01 PROCEDURE — 83605 ASSAY OF LACTIC ACID: CPT | Performed by: SURGERY

## 2018-01-01 PROCEDURE — B5181ZZ FLUOROSCOPY OF SUPERIOR VENA CAVA USING LOW OSMOLAR CONTRAST: ICD-10-PCS | Performed by: RADIOLOGY

## 2018-01-01 PROCEDURE — 87040 BLOOD CULTURE FOR BACTERIA: CPT | Performed by: INTERNAL MEDICINE

## 2018-01-01 PROCEDURE — 74176 CT ABD & PELVIS W/O CONTRAST: CPT | Performed by: INTERNAL MEDICINE

## 2018-01-01 PROCEDURE — 74177 CT ABD & PELVIS W/CONTRAST: CPT | Performed by: SURGERY

## 2018-01-01 PROCEDURE — 36580 REPLACE CVAD CATH: CPT

## 2018-01-01 PROCEDURE — 99211 OFF/OP EST MAY X REQ PHY/QHP: CPT

## 2018-01-01 PROCEDURE — 85025 COMPLETE CBC W/AUTO DIFF WBC: CPT | Performed by: SURGERY

## 2018-01-01 PROCEDURE — 87340 HEPATITIS B SURFACE AG IA: CPT | Performed by: INTERNAL MEDICINE

## 2018-01-01 PROCEDURE — 83690 ASSAY OF LIPASE: CPT | Performed by: INTERNAL MEDICINE

## 2018-01-01 PROCEDURE — 84295 ASSAY OF SERUM SODIUM: CPT | Performed by: INTERNAL MEDICINE

## 2018-01-01 PROCEDURE — 82375 ASSAY CARBOXYHB QUANT: CPT | Performed by: INTERNAL MEDICINE

## 2018-01-01 PROCEDURE — 86901 BLOOD TYPING SEROLOGIC RH(D): CPT | Performed by: HOSPITALIST

## 2018-01-01 PROCEDURE — 85018 HEMOGLOBIN: CPT | Performed by: SURGERY

## 2018-01-01 PROCEDURE — 87205 SMEAR GRAM STAIN: CPT | Performed by: RADIOLOGY

## 2018-01-01 PROCEDURE — 47531 INJECTION FOR CHOLANGIOGRAM: CPT

## 2018-01-01 PROCEDURE — 93010 ELECTROCARDIOGRAM REPORT: CPT | Performed by: HOSPITALIST

## 2018-01-01 PROCEDURE — 80053 COMPREHEN METABOLIC PANEL: CPT | Performed by: INTERNAL MEDICINE

## 2018-01-01 PROCEDURE — 87075 CULTR BACTERIA EXCEPT BLOOD: CPT | Performed by: RADIOLOGY

## 2018-01-01 PROCEDURE — 86920 COMPATIBILITY TEST SPIN: CPT

## 2018-01-01 PROCEDURE — 86850 RBC ANTIBODY SCREEN: CPT | Performed by: HOSPITALIST

## 2018-01-01 PROCEDURE — 88305 TISSUE EXAM BY PATHOLOGIST: CPT | Performed by: SURGERY

## 2018-01-01 PROCEDURE — 85730 THROMBOPLASTIN TIME PARTIAL: CPT | Performed by: INTERNAL MEDICINE

## 2018-01-01 PROCEDURE — 94640 AIRWAY INHALATION TREATMENT: CPT

## 2018-01-01 PROCEDURE — 87206 SMEAR FLUORESCENT/ACID STAI: CPT | Performed by: SURGERY

## 2018-01-01 PROCEDURE — 85390 FIBRINOLYSINS SCREEN I&R: CPT | Performed by: SURGERY

## 2018-01-01 PROCEDURE — 82330 ASSAY OF CALCIUM: CPT | Performed by: INTERNAL MEDICINE

## 2018-01-01 PROCEDURE — 99285 EMERGENCY DEPT VISIT HI MDM: CPT

## 2018-01-01 PROCEDURE — 74177 CT ABD & PELVIS W/CONTRAST: CPT | Performed by: EMERGENCY MEDICINE

## 2018-01-01 PROCEDURE — 36591 DRAW BLOOD OFF VENOUS DEVICE: CPT

## 2018-01-01 PROCEDURE — A4216 STERILE WATER/SALINE, 10 ML: HCPCS | Performed by: SURGERY

## 2018-01-01 PROCEDURE — 93306 TTE W/DOPPLER COMPLETE: CPT | Performed by: INTERNAL MEDICINE

## 2018-01-01 PROCEDURE — 87493 C DIFF AMPLIFIED PROBE: CPT | Performed by: INTERNAL MEDICINE

## 2018-01-01 PROCEDURE — 31500 INSERT EMERGENCY AIRWAY: CPT

## 2018-01-01 PROCEDURE — 3E0F7GC INTRODUCTION OF OTHER THERAPEUTIC SUBSTANCE INTO RESPIRATORY TRACT, VIA NATURAL OR ARTIFICIAL OPENING: ICD-10-PCS | Performed by: HOSPITALIST

## 2018-01-01 PROCEDURE — 82570 ASSAY OF URINE CREATININE: CPT | Performed by: INTERNAL MEDICINE

## 2018-01-01 PROCEDURE — 90935 HEMODIALYSIS ONE EVALUATION: CPT

## 2018-01-01 PROCEDURE — 76376 3D RENDER W/INTRP POSTPROCES: CPT | Performed by: SURGERY

## 2018-01-01 PROCEDURE — 5A1D70Z PERFORMANCE OF URINARY FILTRATION, INTERMITTENT, LESS THAN 6 HOURS PER DAY: ICD-10-PCS | Performed by: INTERNAL MEDICINE

## 2018-01-01 PROCEDURE — 85025 COMPLETE CBC W/AUTO DIFF WBC: CPT | Performed by: EMERGENCY MEDICINE

## 2018-01-01 PROCEDURE — 87040 BLOOD CULTURE FOR BACTERIA: CPT | Performed by: HOSPITALIST

## 2018-01-01 PROCEDURE — 83036 HEMOGLOBIN GLYCOSYLATED A1C: CPT

## 2018-01-01 PROCEDURE — BF111ZZ FLUOROSCOPY OF BILIARY AND PANCREATIC DUCTS USING LOW OSMOLAR CONTRAST: ICD-10-PCS | Performed by: RADIOLOGY

## 2018-01-01 PROCEDURE — 74181 MRI ABDOMEN W/O CONTRAST: CPT | Performed by: SURGERY

## 2018-01-01 PROCEDURE — 86664 EPSTEIN-BARR NUCLEAR ANTIGEN: CPT | Performed by: INTERNAL MEDICINE

## 2018-01-01 PROCEDURE — 85025 COMPLETE CBC W/AUTO DIFF WBC: CPT | Performed by: INTERNAL MEDICINE

## 2018-01-01 PROCEDURE — 49406 IMAGE CATH FLUID PERI/RETRO: CPT

## 2018-01-01 PROCEDURE — BW40ZZZ ULTRASONOGRAPHY OF ABDOMEN: ICD-10-PCS | Performed by: INTERNAL MEDICINE

## 2018-01-01 PROCEDURE — 93005 ELECTROCARDIOGRAM TRACING: CPT

## 2018-01-01 PROCEDURE — 36593 DECLOT VASCULAR DEVICE: CPT

## 2018-01-01 PROCEDURE — 0BH17EZ INSERTION OF ENDOTRACHEAL AIRWAY INTO TRACHEA, VIA NATURAL OR ARTIFICIAL OPENING: ICD-10-PCS | Performed by: ANESTHESIOLOGY

## 2018-01-01 PROCEDURE — 10030 IMG GID FLU COLL DRG SFT TIS: CPT

## 2018-01-01 PROCEDURE — 82150 ASSAY OF AMYLASE: CPT | Performed by: SURGERY

## 2018-01-01 PROCEDURE — 80069 RENAL FUNCTION PANEL: CPT | Performed by: HOSPITALIST

## 2018-01-01 PROCEDURE — 0F9480Z DRAINAGE OF GALLBLADDER WITH DRAINAGE DEVICE, VIA NATURAL OR ARTIFICIAL OPENING ENDOSCOPIC: ICD-10-PCS | Performed by: RADIOLOGY

## 2018-01-01 PROCEDURE — 88173 CYTOPATH EVAL FNA REPORT: CPT | Performed by: SURGERY

## 2018-01-01 PROCEDURE — 83735 ASSAY OF MAGNESIUM: CPT | Performed by: HOSPITALIST

## 2018-01-01 PROCEDURE — 87070 CULTURE OTHR SPECIMN AEROBIC: CPT | Performed by: RADIOLOGY

## 2018-01-01 PROCEDURE — 83050 HGB METHEMOGLOBIN QUAN: CPT | Performed by: INTERNAL MEDICINE

## 2018-01-01 PROCEDURE — 71045 X-RAY EXAM CHEST 1 VIEW: CPT | Performed by: HOSPITALIST

## 2018-01-01 PROCEDURE — 86900 BLOOD TYPING SEROLOGIC ABO: CPT | Performed by: SURGERY

## 2018-01-01 PROCEDURE — 81003 URINALYSIS AUTO W/O SCOPE: CPT | Performed by: EMERGENCY MEDICINE

## 2018-01-01 PROCEDURE — 87205 SMEAR GRAM STAIN: CPT | Performed by: SURGERY

## 2018-01-01 PROCEDURE — 71045 X-RAY EXAM CHEST 1 VIEW: CPT | Performed by: SURGERY

## 2018-01-01 PROCEDURE — 94002 VENT MGMT INPAT INIT DAY: CPT

## 2018-01-01 PROCEDURE — 82330 ASSAY OF CALCIUM: CPT | Performed by: HOSPITALIST

## 2018-01-01 PROCEDURE — 80061 LIPID PANEL: CPT

## 2018-01-01 PROCEDURE — 5A1955Z RESPIRATORY VENTILATION, GREATER THAN 96 CONSECUTIVE HOURS: ICD-10-PCS | Performed by: SURGERY

## 2018-01-01 PROCEDURE — 47490 INCISION OF GALLBLADDER: CPT

## 2018-01-01 PROCEDURE — 5A1955Z RESPIRATORY VENTILATION, GREATER THAN 96 CONSECUTIVE HOURS: ICD-10-PCS | Performed by: ANESTHESIOLOGY

## 2018-01-01 PROCEDURE — 86704 HEP B CORE ANTIBODY TOTAL: CPT | Performed by: INTERNAL MEDICINE

## 2018-01-01 PROCEDURE — 36430 TRANSFUSION BLD/BLD COMPNT: CPT

## 2018-01-01 PROCEDURE — 82550 ASSAY OF CK (CPK): CPT | Performed by: INTERNAL MEDICINE

## 2018-01-01 PROCEDURE — 86901 BLOOD TYPING SEROLOGIC RH(D): CPT | Performed by: SURGERY

## 2018-01-01 PROCEDURE — 96374 THER/PROPH/DIAG INJ IV PUSH: CPT

## 2018-01-01 PROCEDURE — 96376 TX/PRO/DX INJ SAME DRUG ADON: CPT

## 2018-01-01 PROCEDURE — 86706 HEP B SURFACE ANTIBODY: CPT | Performed by: INTERNAL MEDICINE

## 2018-01-01 PROCEDURE — 82248 BILIRUBIN DIRECT: CPT | Performed by: SURGERY

## 2018-01-01 PROCEDURE — 82150 ASSAY OF AMYLASE: CPT | Performed by: RADIOLOGY

## 2018-01-01 PROCEDURE — 82248 BILIRUBIN DIRECT: CPT | Performed by: INTERNAL MEDICINE

## 2018-01-01 PROCEDURE — 86645 CMV ANTIBODY IGM: CPT | Performed by: INTERNAL MEDICINE

## 2018-01-01 PROCEDURE — 96375 TX/PRO/DX INJ NEW DRUG ADDON: CPT

## 2018-01-01 PROCEDURE — 0B113F4 BYPASS TRACHEA TO CUTANEOUS WITH TRACHEOSTOMY DEVICE, PERCUTANEOUS APPROACH: ICD-10-PCS | Performed by: SURGERY

## 2018-01-01 PROCEDURE — 82787 IGG 1 2 3 OR 4 EACH: CPT | Performed by: INTERNAL MEDICINE

## 2018-01-01 PROCEDURE — 87070 CULTURE OTHR SPECIMN AEROBIC: CPT | Performed by: INTERNAL MEDICINE

## 2018-01-01 PROCEDURE — 99153 MOD SED SAME PHYS/QHP EA: CPT | Performed by: INTERNAL MEDICINE

## 2018-01-01 PROCEDURE — 93010 ELECTROCARDIOGRAM REPORT: CPT | Performed by: EMERGENCY MEDICINE

## 2018-01-01 PROCEDURE — 0DH68UZ INSERTION OF FEEDING DEVICE INTO STOMACH, VIA NATURAL OR ARTIFICIAL OPENING ENDOSCOPIC: ICD-10-PCS | Performed by: INTERNAL MEDICINE

## 2018-01-01 PROCEDURE — 82550 ASSAY OF CK (CPK): CPT | Performed by: HOSPITALIST

## 2018-01-01 PROCEDURE — 36584 COMPL RPLCMT PICC RS&I: CPT

## 2018-01-01 PROCEDURE — 86644 CMV ANTIBODY: CPT | Performed by: INTERNAL MEDICINE

## 2018-01-01 PROCEDURE — 83690 ASSAY OF LIPASE: CPT | Performed by: EMERGENCY MEDICINE

## 2018-01-01 PROCEDURE — B51M1ZZ FLUOROSCOPY OF RIGHT UPPER EXTREMITY VEINS USING LOW OSMOLAR CONTRAST: ICD-10-PCS | Performed by: RADIOLOGY

## 2018-01-01 PROCEDURE — 0J2WXYZ CHANGE OTHER DEVICE IN LOWER EXTREMITY SUBCUTANEOUS TISSUE AND FASCIA, EXTERNAL APPROACH: ICD-10-PCS | Performed by: RADIOLOGY

## 2018-01-01 PROCEDURE — 0DHA7UZ INSERTION OF FEEDING DEVICE INTO JEJUNUM, VIA NATURAL OR ARTIFICIAL OPENING: ICD-10-PCS | Performed by: INTERNAL MEDICINE

## 2018-01-01 PROCEDURE — 83036 HEMOGLOBIN GLYCOSYLATED A1C: CPT | Performed by: INTERNAL MEDICINE

## 2018-01-01 PROCEDURE — 82652 VIT D 1 25-DIHYDROXY: CPT

## 2018-01-01 PROCEDURE — 5A09357 ASSISTANCE WITH RESPIRATORY VENTILATION, LESS THAN 24 CONSECUTIVE HOURS, CONTINUOUS POSITIVE AIRWAY PRESSURE: ICD-10-PCS | Performed by: HOSPITALIST

## 2018-01-01 PROCEDURE — 80048 BASIC METABOLIC PNL TOTAL CA: CPT | Performed by: HOSPITALIST

## 2018-01-01 PROCEDURE — 80053 COMPREHEN METABOLIC PANEL: CPT | Performed by: SURGERY

## 2018-01-01 PROCEDURE — 74018 RADEX ABDOMEN 1 VIEW: CPT | Performed by: SURGERY

## 2018-01-01 PROCEDURE — 80076 HEPATIC FUNCTION PANEL: CPT | Performed by: INTERNAL MEDICINE

## 2018-01-01 PROCEDURE — 87077 CULTURE AEROBIC IDENTIFY: CPT | Performed by: INTERNAL MEDICINE

## 2018-01-01 PROCEDURE — 83615 LACTATE (LD) (LDH) ENZYME: CPT | Performed by: INTERNAL MEDICINE

## 2018-01-01 PROCEDURE — 85049 AUTOMATED PLATELET COUNT: CPT | Performed by: HOSPITALIST

## 2018-01-01 PROCEDURE — 84100 ASSAY OF PHOSPHORUS: CPT | Performed by: HOSPITALIST

## 2018-01-01 PROCEDURE — 84478 ASSAY OF TRIGLYCERIDES: CPT | Performed by: EMERGENCY MEDICINE

## 2018-01-01 PROCEDURE — 96361 HYDRATE IV INFUSION ADD-ON: CPT

## 2018-01-01 PROCEDURE — 80076 HEPATIC FUNCTION PANEL: CPT | Performed by: EMERGENCY MEDICINE

## 2018-01-01 PROCEDURE — 85730 THROMBOPLASTIN TIME PARTIAL: CPT | Performed by: RADIOLOGY

## 2018-01-01 PROCEDURE — 36556 INSERT NON-TUNNEL CV CATH: CPT

## 2018-01-01 PROCEDURE — 86022 PLATELET ANTIBODIES: CPT | Performed by: SURGERY

## 2018-01-01 PROCEDURE — 0W9G40Z DRAINAGE OF PERITONEAL CAVITY WITH DRAINAGE DEVICE, PERCUTANEOUS ENDOSCOPIC APPROACH: ICD-10-PCS | Performed by: RADIOLOGY

## 2018-01-01 PROCEDURE — 86900 BLOOD TYPING SEROLOGIC ABO: CPT | Performed by: HOSPITALIST

## 2018-01-01 PROCEDURE — 30233N1 TRANSFUSION OF NONAUTOLOGOUS RED BLOOD CELLS INTO PERIPHERAL VEIN, PERCUTANEOUS APPROACH: ICD-10-PCS | Performed by: HOSPITALIST

## 2018-01-01 PROCEDURE — 0W9G30Z DRAINAGE OF PERITONEAL CAVITY WITH DRAINAGE DEVICE, PERCUTANEOUS APPROACH: ICD-10-PCS | Performed by: RADIOLOGY

## 2018-01-01 PROCEDURE — 86022 PLATELET ANTIBODIES: CPT | Performed by: INTERNAL MEDICINE

## 2018-01-01 PROCEDURE — 87147 CULTURE TYPE IMMUNOLOGIC: CPT | Performed by: INTERNAL MEDICINE

## 2018-01-01 PROCEDURE — 85610 PROTHROMBIN TIME: CPT | Performed by: INTERNAL MEDICINE

## 2018-01-01 PROCEDURE — 71045 X-RAY EXAM CHEST 1 VIEW: CPT | Performed by: CLINICAL NURSE SPECIALIST

## 2018-01-01 PROCEDURE — 87641 MR-STAPH DNA AMP PROBE: CPT | Performed by: HOSPITALIST

## 2018-01-01 PROCEDURE — 84484 ASSAY OF TROPONIN QUANT: CPT | Performed by: EMERGENCY MEDICINE

## 2018-01-01 RX ORDER — HEPARIN SODIUM 1000 [USP'U]/ML
1.5 INJECTION, SOLUTION INTRAVENOUS; SUBCUTANEOUS ONCE
Status: COMPLETED | OUTPATIENT
Start: 2018-01-01 | End: 2018-01-01

## 2018-01-01 RX ORDER — SODIUM CHLORIDE 9 MG/ML
INJECTION, SOLUTION INTRAVENOUS
Status: COMPLETED
Start: 2018-01-01 | End: 2018-01-01

## 2018-01-01 RX ORDER — MAGNESIUM SULFATE HEPTAHYDRATE 40 MG/ML
2 INJECTION, SOLUTION INTRAVENOUS ONCE
Status: COMPLETED | OUTPATIENT
Start: 2018-01-01 | End: 2018-01-01

## 2018-01-01 RX ORDER — HYDROMORPHONE HYDROCHLORIDE 1 MG/ML
0.4 INJECTION, SOLUTION INTRAMUSCULAR; INTRAVENOUS; SUBCUTANEOUS EVERY 5 MIN PRN
Status: DISCONTINUED | OUTPATIENT
Start: 2018-01-01 | End: 2018-01-01

## 2018-01-01 RX ORDER — LIDOCAINE HYDROCHLORIDE 10 MG/ML
INJECTION, SOLUTION EPIDURAL; INFILTRATION; INTRACAUDAL; PERINEURAL AS NEEDED
Status: DISCONTINUED | OUTPATIENT
Start: 2018-01-01 | End: 2018-01-01 | Stop reason: SURG

## 2018-01-01 RX ORDER — SODIUM CHLORIDE 9 MG/ML
INJECTION, SOLUTION INTRAVENOUS
Status: DISPENSED
Start: 2018-01-01 | End: 2018-01-01

## 2018-01-01 RX ORDER — CASTOR OIL AND BALSAM, PERU 788; 87 MG/G; MG/G
OINTMENT TOPICAL AS NEEDED
Status: DISCONTINUED | OUTPATIENT
Start: 2018-01-01 | End: 2018-01-01

## 2018-01-01 RX ORDER — METOCLOPRAMIDE HYDROCHLORIDE 5 MG/ML
INJECTION INTRAMUSCULAR; INTRAVENOUS AS NEEDED
Status: DISCONTINUED | OUTPATIENT
Start: 2018-01-01 | End: 2018-01-01 | Stop reason: SURG

## 2018-01-01 RX ORDER — HEPARIN SODIUM AND DEXTROSE 10000; 5 [USP'U]/100ML; G/100ML
1000 INJECTION INTRAVENOUS ONCE
Status: COMPLETED | OUTPATIENT
Start: 2018-01-01 | End: 2018-01-01

## 2018-01-01 RX ORDER — SODIUM CHLORIDE 9 MG/ML
INJECTION, SOLUTION INTRAVENOUS ONCE
Status: COMPLETED | OUTPATIENT
Start: 2018-01-01 | End: 2018-01-01

## 2018-01-01 RX ORDER — LIDOCAINE HYDROCHLORIDE 20 MG/ML
INJECTION, SOLUTION EPIDURAL; INFILTRATION; INTRACAUDAL; PERINEURAL
Status: COMPLETED
Start: 2018-01-01 | End: 2018-01-01

## 2018-01-01 RX ORDER — SODIUM CHLORIDE 0.9 % (FLUSH) 0.9 %
10 SYRINGE (ML) INJECTION AS NEEDED
Status: DISCONTINUED | OUTPATIENT
Start: 2018-01-01 | End: 2018-01-01

## 2018-01-01 RX ORDER — LORAZEPAM 2 MG/ML
0.5 INJECTION INTRAMUSCULAR EVERY 8 HOURS PRN
Status: DISCONTINUED | OUTPATIENT
Start: 2018-01-01 | End: 2018-01-01

## 2018-01-01 RX ORDER — IPRATROPIUM BROMIDE AND ALBUTEROL SULFATE 2.5; .5 MG/3ML; MG/3ML
3 SOLUTION RESPIRATORY (INHALATION)
Status: DISCONTINUED | OUTPATIENT
Start: 2018-01-01 | End: 2018-01-01

## 2018-01-01 RX ORDER — HEPARIN SODIUM AND DEXTROSE 10000; 5 [USP'U]/100ML; G/100ML
18 INJECTION INTRAVENOUS ONCE
Status: COMPLETED | OUTPATIENT
Start: 2018-01-01 | End: 2018-01-01

## 2018-01-01 RX ORDER — HEPARIN SODIUM 1000 [USP'U]/ML
INJECTION, SOLUTION INTRAVENOUS; SUBCUTANEOUS
Status: COMPLETED
Start: 2018-01-01 | End: 2018-01-01

## 2018-01-01 RX ORDER — SODIUM CHLORIDE 9 MG/ML
INJECTION, SOLUTION INTRAVENOUS
Status: DISCONTINUED | OUTPATIENT
Start: 2018-01-01 | End: 2018-01-01

## 2018-01-01 RX ORDER — ACETAMINOPHEN 10 MG/ML
INJECTION, SOLUTION INTRAVENOUS
Status: COMPLETED
Start: 2018-01-01 | End: 2018-01-01

## 2018-01-01 RX ORDER — 0.9 % SODIUM CHLORIDE 0.9 %
VIAL (ML) INJECTION
Status: COMPLETED
Start: 2018-01-01 | End: 2018-01-01

## 2018-01-01 RX ORDER — LORAZEPAM 2 MG/ML
1 INJECTION INTRAMUSCULAR EVERY 4 HOURS PRN
Status: DISCONTINUED | OUTPATIENT
Start: 2018-01-01 | End: 2018-01-01

## 2018-01-01 RX ORDER — HYDROMORPHONE HYDROCHLORIDE 1 MG/ML
1 INJECTION, SOLUTION INTRAMUSCULAR; INTRAVENOUS; SUBCUTANEOUS ONCE
Status: COMPLETED | OUTPATIENT
Start: 2018-01-01 | End: 2018-01-01

## 2018-01-01 RX ORDER — HYDROMORPHONE HYDROCHLORIDE 1 MG/ML
0.5 INJECTION, SOLUTION INTRAMUSCULAR; INTRAVENOUS; SUBCUTANEOUS ONCE
Status: COMPLETED | OUTPATIENT
Start: 2018-01-01 | End: 2018-01-01

## 2018-01-01 RX ORDER — HYDROMORPHONE HYDROCHLORIDE 1 MG/ML
0.6 INJECTION, SOLUTION INTRAMUSCULAR; INTRAVENOUS; SUBCUTANEOUS EVERY 5 MIN PRN
Status: DISCONTINUED | OUTPATIENT
Start: 2018-01-01 | End: 2018-01-01

## 2018-01-01 RX ORDER — 0.9 % SODIUM CHLORIDE 0.9 %
VIAL (ML) INJECTION
Status: DISPENSED
Start: 2018-01-01 | End: 2018-01-01

## 2018-01-01 RX ORDER — ALBUTEROL SULFATE 90 UG/1
1 AEROSOL, METERED RESPIRATORY (INHALATION) EVERY 4 HOURS PRN
COMMUNITY

## 2018-01-01 RX ORDER — MIDAZOLAM HYDROCHLORIDE 1 MG/ML
INJECTION INTRAMUSCULAR; INTRAVENOUS
Status: COMPLETED
Start: 2018-01-01 | End: 2018-01-01

## 2018-01-01 RX ORDER — HEPARIN SODIUM AND DEXTROSE 10000; 5 [USP'U]/100ML; G/100ML
INJECTION INTRAVENOUS CONTINUOUS
Status: DISCONTINUED | OUTPATIENT
Start: 2018-01-01 | End: 2018-01-01

## 2018-01-01 RX ORDER — ALBUTEROL SULFATE 90 UG/1
1 AEROSOL, METERED RESPIRATORY (INHALATION) EVERY 4 HOURS PRN
Status: DISCONTINUED | OUTPATIENT
Start: 2018-01-01 | End: 2018-01-01

## 2018-01-01 RX ORDER — ACETAMINOPHEN 10 MG/ML
1000 INJECTION, SOLUTION INTRAVENOUS ONCE
Status: COMPLETED | OUTPATIENT
Start: 2018-01-01 | End: 2018-01-01

## 2018-01-01 RX ORDER — ROCURONIUM BROMIDE 10 MG/ML
INJECTION, SOLUTION INTRAVENOUS AS NEEDED
Status: DISCONTINUED | OUTPATIENT
Start: 2018-01-01 | End: 2018-01-01 | Stop reason: SURG

## 2018-01-01 RX ORDER — HEPARIN SODIUM 5000 [USP'U]/ML
5000 INJECTION, SOLUTION INTRAVENOUS; SUBCUTANEOUS EVERY 8 HOURS SCHEDULED
Status: DISCONTINUED | OUTPATIENT
Start: 2018-01-01 | End: 2018-01-01

## 2018-01-01 RX ORDER — HYDROMORPHONE HYDROCHLORIDE 1 MG/ML
0.2 INJECTION, SOLUTION INTRAMUSCULAR; INTRAVENOUS; SUBCUTANEOUS EVERY 5 MIN PRN
Status: DISCONTINUED | OUTPATIENT
Start: 2018-01-01 | End: 2018-01-01

## 2018-01-01 RX ORDER — SODIUM CHLORIDE, SODIUM LACTATE, POTASSIUM CHLORIDE, CALCIUM CHLORIDE 600; 310; 30; 20 MG/100ML; MG/100ML; MG/100ML; MG/100ML
INJECTION, SOLUTION INTRAVENOUS CONTINUOUS
Status: DISCONTINUED | OUTPATIENT
Start: 2018-01-01 | End: 2018-01-01

## 2018-01-01 RX ORDER — NALOXONE HYDROCHLORIDE 0.4 MG/ML
80 INJECTION, SOLUTION INTRAMUSCULAR; INTRAVENOUS; SUBCUTANEOUS AS NEEDED
Status: ACTIVE | OUTPATIENT
Start: 2018-01-01 | End: 2018-01-01

## 2018-01-01 RX ORDER — HEPARIN SODIUM 1000 [USP'U]/ML
1.5 INJECTION, SOLUTION INTRAVENOUS; SUBCUTANEOUS AS NEEDED
Status: DISCONTINUED | OUTPATIENT
Start: 2018-01-01 | End: 2018-01-01

## 2018-01-01 RX ORDER — HYDROMORPHONE HYDROCHLORIDE 1 MG/ML
0.5 INJECTION, SOLUTION INTRAMUSCULAR; INTRAVENOUS; SUBCUTANEOUS
Status: DISCONTINUED | OUTPATIENT
Start: 2018-01-01 | End: 2018-01-01

## 2018-01-01 RX ORDER — HALOPERIDOL 5 MG/ML
0.25 INJECTION INTRAMUSCULAR ONCE AS NEEDED
Status: ACTIVE | OUTPATIENT
Start: 2018-01-01 | End: 2018-01-01

## 2018-01-01 RX ORDER — DEXTROSE MONOHYDRATE 25 G/50ML
50 INJECTION, SOLUTION INTRAVENOUS
Status: DISCONTINUED | OUTPATIENT
Start: 2018-01-01 | End: 2018-01-01

## 2018-01-01 RX ORDER — LIDOCAINE HYDROCHLORIDE 10 MG/ML
0.5 INJECTION, SOLUTION INFILTRATION; PERINEURAL ONCE AS NEEDED
Status: ACTIVE | OUTPATIENT
Start: 2018-01-01 | End: 2018-01-01

## 2018-01-01 RX ORDER — ACETAMINOPHEN 10 MG/ML
1000 INJECTION, SOLUTION INTRAVENOUS EVERY 8 HOURS PRN
Status: DISCONTINUED | OUTPATIENT
Start: 2018-01-01 | End: 2018-01-01

## 2018-01-01 RX ORDER — HEPARIN SODIUM AND DEXTROSE 10000; 5 [USP'U]/100ML; G/100ML
INJECTION INTRAVENOUS CONTINUOUS
Status: DISPENSED | OUTPATIENT
Start: 2018-01-01 | End: 2018-01-01

## 2018-01-01 RX ORDER — CHLORHEXIDINE GLUCONATE 0.12 MG/ML
15 RINSE ORAL
Status: DISCONTINUED | OUTPATIENT
Start: 2018-01-01 | End: 2018-01-01

## 2018-01-01 RX ORDER — NALOXONE HYDROCHLORIDE 0.4 MG/ML
80 INJECTION, SOLUTION INTRAMUSCULAR; INTRAVENOUS; SUBCUTANEOUS AS NEEDED
Status: DISCONTINUED | OUTPATIENT
Start: 2018-01-01 | End: 2018-01-01 | Stop reason: HOSPADM

## 2018-01-01 RX ORDER — ONDANSETRON 2 MG/ML
4 INJECTION INTRAMUSCULAR; INTRAVENOUS ONCE
Status: COMPLETED | OUTPATIENT
Start: 2018-01-01 | End: 2018-01-01

## 2018-01-01 RX ORDER — ONDANSETRON 2 MG/ML
INJECTION INTRAMUSCULAR; INTRAVENOUS AS NEEDED
Status: DISCONTINUED | OUTPATIENT
Start: 2018-01-01 | End: 2018-01-01 | Stop reason: SURG

## 2018-01-01 RX ORDER — SODIUM CHLORIDE 9 MG/ML
INJECTION, SOLUTION INTRAVENOUS ONCE
Status: DISCONTINUED | OUTPATIENT
Start: 2018-01-01 | End: 2018-01-01

## 2018-01-01 RX ORDER — HYDROMORPHONE HYDROCHLORIDE 1 MG/ML
0.5 INJECTION, SOLUTION INTRAMUSCULAR; INTRAVENOUS; SUBCUTANEOUS EVERY 2 HOUR PRN
Status: DISCONTINUED | OUTPATIENT
Start: 2018-01-01 | End: 2018-01-01

## 2018-01-01 RX ORDER — IPRATROPIUM BROMIDE AND ALBUTEROL SULFATE 2.5; .5 MG/3ML; MG/3ML
3 SOLUTION RESPIRATORY (INHALATION) EVERY 2 HOUR PRN
Status: DISCONTINUED | OUTPATIENT
Start: 2018-01-01 | End: 2018-01-01

## 2018-01-01 RX ORDER — CALCIUM GLUCONATE 94 MG/ML
INJECTION, SOLUTION INTRAVENOUS
Status: DISCONTINUED
Start: 2018-01-01 | End: 2018-01-01 | Stop reason: WASHOUT

## 2018-01-01 RX ORDER — HEPARIN SODIUM 1000 [USP'U]/ML
3000 INJECTION, SOLUTION INTRAVENOUS; SUBCUTANEOUS ONCE
Status: COMPLETED | OUTPATIENT
Start: 2018-01-01 | End: 2018-01-01

## 2018-01-01 RX ORDER — IBUPROFEN 200 MG
CAPSULE ORAL 3 TIMES DAILY
Status: DISCONTINUED | OUTPATIENT
Start: 2018-01-01 | End: 2018-01-01

## 2018-01-01 RX ORDER — LIDOCAINE HYDROCHLORIDE 40 MG/ML
SOLUTION TOPICAL
Status: COMPLETED
Start: 2018-01-01 | End: 2018-01-01

## 2018-01-01 RX ORDER — HYDROMORPHONE HYDROCHLORIDE 1 MG/ML
1 INJECTION, SOLUTION INTRAMUSCULAR; INTRAVENOUS; SUBCUTANEOUS EVERY 2 HOUR PRN
Status: DISCONTINUED | OUTPATIENT
Start: 2018-01-01 | End: 2018-01-01

## 2018-01-01 RX ORDER — HYDROMORPHONE HYDROCHLORIDE 1 MG/ML
1 INJECTION, SOLUTION INTRAMUSCULAR; INTRAVENOUS; SUBCUTANEOUS
Status: DISCONTINUED | OUTPATIENT
Start: 2018-01-01 | End: 2018-01-01

## 2018-01-01 RX ORDER — ONDANSETRON 2 MG/ML
4 INJECTION INTRAMUSCULAR; INTRAVENOUS ONCE AS NEEDED
Status: ACTIVE | OUTPATIENT
Start: 2018-01-01 | End: 2018-01-01

## 2018-01-01 RX ORDER — DEXTROSE MONOHYDRATE 25 G/50ML
50 INJECTION, SOLUTION INTRAVENOUS
Status: DISCONTINUED | OUTPATIENT
Start: 2018-01-01 | End: 2018-01-01 | Stop reason: HOSPADM

## 2018-01-01 RX ORDER — DEXTROSE MONOHYDRATE 25 G/50ML
INJECTION, SOLUTION INTRAVENOUS
Status: COMPLETED
Start: 2018-01-01 | End: 2018-01-01

## 2018-01-01 RX ORDER — MIDAZOLAM HYDROCHLORIDE 1 MG/ML
INJECTION INTRAMUSCULAR; INTRAVENOUS
Status: DISCONTINUED | OUTPATIENT
Start: 2018-01-01 | End: 2018-01-01

## 2018-01-01 RX ORDER — DEXTROSE MONOHYDRATE 25 G/50ML
50 INJECTION, SOLUTION INTRAVENOUS AS NEEDED
Status: DISCONTINUED | OUTPATIENT
Start: 2018-01-01 | End: 2018-01-01

## 2018-01-01 RX ADMIN — SODIUM CHLORIDE, SODIUM LACTATE, POTASSIUM CHLORIDE, CALCIUM CHLORIDE: 600; 310; 30; 20 INJECTION, SOLUTION INTRAVENOUS at 16:27:00

## 2018-01-01 RX ADMIN — LIDOCAINE HYDROCHLORIDE 25 MG: 10 INJECTION, SOLUTION EPIDURAL; INFILTRATION; INTRACAUDAL; PERINEURAL at 16:31:00

## 2018-01-01 RX ADMIN — SODIUM CHLORIDE, SODIUM LACTATE, POTASSIUM CHLORIDE, CALCIUM CHLORIDE: 600; 310; 30; 20 INJECTION, SOLUTION INTRAVENOUS at 16:22:00

## 2018-01-01 RX ADMIN — METOCLOPRAMIDE HYDROCHLORIDE 10 MG: 5 INJECTION INTRAMUSCULAR; INTRAVENOUS at 16:46:00

## 2018-01-01 RX ADMIN — SODIUM CHLORIDE, SODIUM LACTATE, POTASSIUM CHLORIDE, CALCIUM CHLORIDE: 600; 310; 30; 20 INJECTION, SOLUTION INTRAVENOUS at 16:45:00

## 2018-01-01 RX ADMIN — SODIUM CHLORIDE: 9 INJECTION, SOLUTION INTRAVENOUS at 07:50:00

## 2018-01-01 RX ADMIN — ROCURONIUM BROMIDE 20 MG: 10 INJECTION, SOLUTION INTRAVENOUS at 08:00:00

## 2018-01-01 RX ADMIN — ONDANSETRON 4 MG: 2 INJECTION INTRAMUSCULAR; INTRAVENOUS at 16:46:00

## 2018-06-08 PROBLEM — E87.6 HYPOKALEMIA: Status: ACTIVE | Noted: 2018-01-01

## 2018-06-08 PROBLEM — K85.90 ACUTE PANCREATITIS, UNSPECIFIED COMPLICATION STATUS, UNSPECIFIED PANCREATITIS TYPE: Status: ACTIVE | Noted: 2018-01-01

## 2018-06-08 PROBLEM — K85.90 ACUTE PANCREATITIS: Status: ACTIVE | Noted: 2018-01-01

## 2018-06-08 PROBLEM — R73.9 HYPERGLYCEMIA: Status: ACTIVE | Noted: 2018-01-01

## 2018-06-08 PROBLEM — R94.5 LIVER FUNCTION ABNORMALITY: Status: ACTIVE | Noted: 2018-01-01

## 2018-06-08 NOTE — CONSULTS
REFERRING PHYSICIAN: Dr. Seals ref. provider found    HPI:         Thank you very much for requesting me to see the patient.     As you know, Anthony Daniel is a 47year old male who presents today with 3 days of intermittent upper abd pain -- came to er cheko anicteric; no JVD. NECK: supple, no adenopathy, no bruits  LUNGS: clear to auscultation  CARDIO: RRR, nl s1 and s2. No murmers appreciated. GI: Soft, tender to palpation without rebound; ND, no BS appreciated;  NO HSM, masses, hernias or bruits.   EXTREMI

## 2018-06-08 NOTE — ED NOTES
Pt c/o severe upper abdominal pain that is constant and non-radiating. States that pain began 3 days ago and is worse today after eating. He is restless, very diaphoretic and c/o nausea. Up to bathroom now stating, \"I have to move my bowels. \"

## 2018-06-08 NOTE — ED PROVIDER NOTES
Patient Seen in: [facility] Emergency Department    History   Patient presents with:  Abdomen/Flank Pain (GI/)    Stated Complaint: abdominal pain    HPI    Patient is a 67-year-old male with past history of asthma, \"ulcer\" in the past who pre nondistended; there is moderate left upper quadrant tenderness to palpation. There is no guarding or rebound  Neurologic: Patient is awake, alert and oriented ×3.   The patient's motor strength is 5 out of 5 and symmetric in the upper and lower extremities the following:     Glucose 379 (*)     Sodium 132 (*)     Potassium 5.6 (*)     CO2 20 (*)     BUN 35 (*)     Creatinine 3.36 (*)     Calcium, Total 6.9 (*)     Calculated Osmolality 298 (*)     GFR, Non- 20 (*)     GFR, -American 23 -American 15 (*)     All other components within normal limits   POCT GLUCOSE - Abnormal; Notable for the following:     POC Glucose  359 (*)     All other components within normal limits   POCT GLUCOSE - Abnormal; Notable for the following:     POC 6583  ------------------------------------------------------------  Patient required multiple doses of pain medicine while in the emergency room due to recurrent reproducible left upper quadrant/periumbilical tenderness.     Case was discussed with the [facility]

## 2018-06-08 NOTE — ED INITIAL ASSESSMENT (HPI)
Pt arrived via medics to rm 37 with 18g to left ac for complaint of abdominal pain with n/v, states started after lunch while at work, history of ulcer, medics administered 4mg zofran and 100mcg fentanyl, pt is pale and diaphoretic

## 2018-06-09 NOTE — OPERATIVE REPORT
OPERATIVE REPORT   PATIENT NAME: Annabel Musa  MRN: [id number]  DATE OF OPERATION: 6/8/2018 - 6/9/2018  PREOPERATIVE DIAGNOSIS:   1. Severe gallstone pancreatitis. MRI MRCP suggested the presence of stones in the bile duct.   Of note patient liver enzymes wall appeared to be significantly thickened. There is inflammatory changes extending to the area surrounding the duodenal wall.   The common bile duct was visualized measure approximately 6 mm maximum diameter  proximally, no obvious stones or sludge seen

## 2018-06-09 NOTE — PROGRESS NOTES
[facility] Hospitalist Progress Note     PCP: No primary care provider on file. Chief Complaint: follow-up    Overnight/Interim Events:      SUBJECTIVE:  Attempted to see pt earlier, off floor for procedure    Pain ok, no n/v. Reports abd usually this big. Sulfate HFA       Assessment/Plan:        47year old male with PMH sig for ashtma who p/t [facility] [place] ED c abd pain and severe acute gallstone pancreatitis.      Abd pain and severe acute gallstone pancreatitis, elevated LFTs/bili  -CT c diffuse pancreatitis, lip

## 2018-06-09 NOTE — ANESTHESIA POSTPROCEDURE EVALUATION
Patient: Jason Rasmussen    Procedure Summary     Date:  06/09/18 Room / Location:  [address] [place] [address] ENDOSCOPY 01 / [address] [place] [address] ENDOSCOPY    Anesthesia Start:  0275 Anesthesia Stop:  7724    Procedure:  ENDOSCOPIC ULTRASOUND (EUS) (N/A ) Diagnosis:  (ACUTE PANCREATITIS, NO BILE

## 2018-06-09 NOTE — PROGRESS NOTES
GI  PROGRESS NOTE    SUBJECTIVE: relates pain is better controlled; wants to eat. Sister at bedside.        OBJECTIVE:  Temp:  [97 °F (36.1 °C)-98.7 °F (37.1 °C)] 98.7 °F (37.1 °C)  Pulse:  [] 122  Resp:  [20-24] 20  BP: (121-147)/() 121/91  E Hepatic steatosis. 5. Gastric lipoma. 6. Indeterminate left adrenal nodule, statistically most likely to represent an adenoma. 7. Small pleural effusions.     PLAN: 1.) case d/w Dr. Karly Sheppard -- he is to review MRCP and call me back   2.) NPO; IV hydration

## 2018-06-09 NOTE — H&P
General Medicine H&P     Patient presents with:  Abdomen/Flank Pain (GI/)       PCP: No primary care provider on file. History of Present Illness: Patient is a 47year old male with PMH sig for asha who p/t [facility]note 3[facility] ED c abd pain and acute pancreatitis. (FIH=05655)    Result Date: 6/8/2018  PROCEDURE: US GALLBLADDER (GCQ=54695)  COMPARISON: [facility], CT ABDOMEN PELVIS IV CONTRAST NO ORAL (ER), 6/08/2018, 16:07.   INDICATIONS: Abdominal pain  TECHNIQUE:   The gallbladder was evaluated with PANCREAS: There is diffuse peripancreatic edema. Mild free fluid tracks into the mesentery and along the right greater than left anterior pararenal space. No pancreatic ductal dilatation. The gland shows hypoenhancement with relative sparing of the tail. aggressive  -MRI/MRCP given elevated LFTs; likely may need GB surgery in future  -zofran, dilatudid, try to minimize  -PPI  -Pt drinks 2-3x/week, denies recent binge  -Leukocytosis 12.4 2/2 this     asthma  -No exac, albuterol    Proph  -sq hep    Outpatie

## 2018-06-09 NOTE — CONSULTS
Critical Care H&P/Consult     NAME: Salvador Zepeda - ROOM: [place] [address] PACU - MRN: [id number] - Age: 47year old - :  1965    Date of Admission: 2018  1:58 PM  Admission Diagnosis: Liver function abnormality [K76.89]  Acute pancreatitis, unspec thrombosis. Felt to be due to gall stones. Went to ERCP. No retained stones. Past Medical History:   Diagnosis Date   • Asthma    • Ulcer of abdomen wall ([address]    History reviewed. No pertinent surgical history.   Social History:   Smoking status: [employer] (PROTONIX) 40 mg in Sodium Chloride 0.9 % 10 mL IV push 40 mg Intravenous Q24H   HYDROmorphone HCl (DILAUDID) 1 MG/ML injection 0.5 mg 0.5 mg Intravenous Q3H PRN   Or      HYDROmorphone HCl (DILAUDID) 1 MG/ML injection 1 mg 1 mg Intravenous Q3H PRN   Albut ALKPHO  104*  71   AST  272*  115*   ALT  356*  229*   BILT  1.3*  1.3*   TP  7.7  6.5     No results for input(s): ABGPHT, VGCRYK5L, KDGKK2P, ABGHCO3, ABGBE, TEMP, ALEKSANDAR, SITE, DEV, THGB in the last 168 hours.     Invalid input(s): PZT49ICP, CHOB  Recent

## 2018-06-09 NOTE — PLAN OF CARE
Problem: Patient/Family Goals  Goal: Patient/Family Long Term Goal  Patient's Long Term Goal: return home    Interventions:  - MRI/MRCP of abdomen  - NPO  - fluids  - electrolyte coverage  - See additional Care Plan goals for specific interventions   Outco assistance with activity based on assessment  - Modify environment to reduce risk of injury  - Provide assistive devices as appropriate  - Consider OT/PT consult to assist with strengthening/mobility  - Encourage toileting schedule  Outcome: Progressing  N

## 2018-06-09 NOTE — CONSULTS
[facility] REPORT    Briana Griffith  OEK:[id number]  LOS:1    Date of Admission:  6/8/2018  Date of Consult:  6/9/2018     Reason for Consultation: necrotizing pancreatitis      History of Present Illness:  [employer]) MCG/ACT inhaler 1 puff, 1 puff, Inhalation, Q4H PRN  •  Heparin Sodium (Porcine) 5000 UNIT/ML injection 5,000 Units, 5,000 Units, Subcutaneous, Q8H [address]    Review of Systems:   Pertinent items are noted in HPI.   Constitutional: positive for anorexia 06/09/2018   HCT 48.2 06/09/2018    06/09/2018    06/09/2018    06/09/2018   K 4.8 06/09/2018   K 4.8 06/09/2018   K 4.8 06/09/2018    06/09/2018    06/09/2018   CO2 27 06/09/2018   CO2 25 06/09/2018   BUN 19 06/09/2018   BUN the tail, findings highly suspicious for necrotizing component. No organized collections are present at this time. Splenic and portal veins remains patent.   Attenuation of the distal superior mesenteric vein may be related to extrinsic compression from papo spent with patient:  1 Hour 15 Minutes

## 2018-06-09 NOTE — ANESTHESIA PREPROCEDURE EVALUATION
Anesthesia PreOp Note    HPI:     Jason Rasmussen is a 47year old male who presents for preoperative consultation requested by: Funmilayo Baldwin MD    Date of Surgery: 6/8/2018 - 6/9/2018    Procedure(s):  ENDOSCOPIC ULTRASOUND (EUS)  ENDOSCOPIC RETROGRADE Albuterol Sulfate  (90 Base) MCG/ACT inhaler 1 puff 1 puff Inhalation Q4H PRN Margorie Essex, MD 1 puff at 06/09/18 1436    Heparin Sodium (Porcine) 5000 UNIT/ML injection 5,000 Units 5,000 Units Subcutaneous Pending sale to [facility] Margorie Essex, MD (!) 121/91 (!) 157/103  141/90   BP Location: Right arm Right arm  Left arm   Pulse: (!) 122 (!) 124  (!) 127   Resp: 20 20     Temp: 98.7 °F (37.1 °C)  99.2 °F (37.3 °C)    TempSrc: Oral  Oral    SpO2: 93% 93%  92%   Weight:       Height:            Anest

## 2018-06-09 NOTE — PLAN OF CARE
Problem: Patient/Family Goals  Goal: Patient/Family Long Term Goal  Patient's Long Term Goal: return home    Interventions:  - MRI/MRCP of abdomen  - NPO  - fluids  - electrolyte coverage  - See additional Care Plan goals for specific interventions    Outc environment to reduce risk of injury  - Provide assistive devices as appropriate  - Consider OT/PT consult to assist with strengthening/mobility  - Encourage toileting schedule   Outcome: Progressing  Able to ambulate in room with standby assist d/t pain a today.

## 2018-06-10 NOTE — PROGRESS NOTES
[facility]    General Surgery Progress Note  Palmer Gravely  IYJ:[id number]  HD# 2       Subjective:   Feels about the same has abdominal pain with movement but comfortable when still  Passing no flatus or BM yet, breathing comfortably    Ex 14.0  14.0   --   14.2   WBC  12.4*  10.8   --   19.0*   PLT  239  181   --   181       Recent Labs   Lab  06/09/18   0612  06/09/18   2028  06/10/18   0437   GLU  265*  270*  379*  480*  480*   BUN  19  19  35*  49*  49*   CREATSERUM  1.73*  1.65*  3.36*

## 2018-06-10 NOTE — CONSULTS
[facility] - Nephrology  Report of Consultation    David Juanita Patient Status:  Inpatient    1965 MRN [id number]   Location [facility] 2W/SW Attending Randolph Otero MD   Hosp Day # 2 PCP No primary care provider on file. in Sodium Chloride 0.9 % 10 mL IV push, 40 mg, Intravenous, Q24H  •  Albuterol Sulfate  (90 Base) MCG/ACT inhaler 1 puff, 1 puff, Inhalation, Q4H PRN      Prescriptions Prior to Admission:  Albuterol Sulfate  (90 Base) MCG/ACT Inhalation Aero 13.9 06/10/2018   HGB 13.9 06/10/2018   HCT 42.8 06/10/2018    06/10/2018   MPV 9.8 06/10/2018   MCV 86.3 06/10/2018   MCH 28.0 06/10/2018   MCHC 32.4 06/10/2018   RDW 14.2 06/10/2018   NEPERCENT 68 06/08/2018   LYPERCENT 21 06/08/2018   MOPERCENT 8 improved with insulin drip. Continue    [address]. IV fluid as tolerated. Hypocalcemia–from pancreatitis. 2g IV.  Check ionized Ca     D/w GI and Pulm    Nina Sheldon, [address] [facility] Nephrology

## 2018-06-10 NOTE — CONSULTS
[facility] AND [facility]  Report of Consultation    Jarad Hurtado Patient Status:  Inpatient    1965 MRN [id number]   Location [facility] 2W/SW Attending Noemi Beckwith MD   Hosp Day # 2 PCP No primary care provider on file.      Reason for Co positives and negatives per the HPI. Physical Exam:   Blood pressure 106/61, pulse (!) 132, temperature 99 °F (37.2 °C), resp. rate 23, height 1.778 m (5' 10\"), weight 129 kg (284 lb 6.3 oz), SpO2 93 %.   General: Patient is alert and oriented x 3, in m consequence of severe pancreatitis. He should continue anticoagulation for ideally 3 to 6 months but is currently critically ill. 1. Pancreatitis  -Necrotizing  -Per GI    2.  Portal, mesenteric and splenic vein thrombosis  -Given renal function continue

## 2018-06-10 NOTE — PLAN OF CARE
GASTROINTESTINAL - ADULT    • Maintains or returns to baseline bowel function Not Progressing    • Maintains adequate nutritional intake (undernourished) Not Progressing          DISCHARGE PLANNING    • Discharge to home or other facility with appropriate

## 2018-06-10 NOTE — PROGRESS NOTES
Received pt from PACU at 1830, pt drowsy but responsive & oriented. Pain w/movement, pt medicated just prior to transfer, comfortable overall. Pt's sister here, oriented to unit. Heparin started per MD order, no bolus.

## 2018-06-10 NOTE — PLAN OF CARE
DISCHARGE PLANNING    • Discharge to home or other facility with appropriate resources Not Progressing        GASTROINTESTINAL - ADULT    • Maintains or returns to baseline bowel function Not Progressing    • Maintains adequate nutritional intake (undernou

## 2018-06-10 NOTE — PROGRESS NOTES
[facility] Hospitalist Progress Note     PCP: No primary care provider on file. Chief Complaint: follow-up    Overnight/Interim Events:      SUBJECTIVE:  Pt laying in bed, pain ok. . On 4L.  BG < 200, u/o lower side    OBJECTIVE:  Temp:  [97.7 °F (36.5 06/09/18   0612  06/10/18   0437  06/10/18   1247   ALT  356*  229*  125*   --    AST  272*  115*  91*   --    ALB  4.4  3.5  3.0*  3.0*  2.8*   LDH   --    --   623*   --        Recent Labs   Lab  06/10/18   1016  06/10/18   1103  06/10/18   1202  06/10/1 above     Hyperkalemia/hypocalcemia  -per renal     FEN-plan NJ feeding tube tmrw    Proph  -hep gtt     Dispo: ICU    Questions/concerns were discussed with patient by bedside.  D/w CHAPIN Calvo and Dr. Maya Valverde MD  [facility] Hospitalist  [phone]

## 2018-06-10 NOTE — PROGRESS NOTES
GI  PROGRESS NOTE    SUBJECTIVE:  Relates pain is controlled.      OBJECTIVE:  Temp:  [97.7 °F (36.5 °C)-99.2 °F (37.3 °C)] 99 °F (37.2 °C)  Pulse:  [114-133] 132  Resp:  [20-30] 23  BP: ()/() 106/61  Exam  Gen: laying flat on his back, deep b mesenteric & splenic vein thrombosis --> anticoagulated. Appreciate input from Hematology colleagues. 3.) Acute renal failure, oliguric -- nephrology consulted.    4.) Hyperglycemia secondary to severe pancreatitis -- on insulin gtt -- may be \"brittle\"

## 2018-06-10 NOTE — PROGRESS NOTES
Cam Chou Patient Status:  Inpatient    1965 MRN [id number]   Location [facility] 2W/SW Attending Alexus Sanford MD   Hosp Day # 2 PCP No primary care provider on file. Critical Care Progress Note      Assessment/Plan:  1.  Se hyperglycemic now    Objective:    Medications:  • Insulin Regular Human  1-5 Units Subcutaneous 4 times per day   • pantoprazole (PROTONIX) IV push  40 mg Intravenous Q24H            Intake/Output Summary (Last 24 hours) at 06/10/18 4825  Last data filed Imaging: I independently visualized all relevant chest imaging in PACS, agree with radiology interpretation except where noted.

## 2018-06-11 NOTE — PROGRESS NOTES
[facility] - [facility]    General Surgery Progress Note  Estela MENSAH:[id number]  HD# 3       Subjective:   Feels about the same but more tachypneic and requiring BiPAP; has abdominal pain with movement but comfortable when still  Passing small B Output              300 ml   Net          1806.55 ml       Xr Abdomen (1 View) (cpt=74018)    Result Date: 6/11/2018  CONCLUSION:  1. Reflex ileus. Dictated by (CST): Blake Delcid MD on 6/11/2018 at 13:37     Approved by (CST):  Blake Delcid [address]  06/11/18  2:28 PM

## 2018-06-11 NOTE — PROGRESS NOTES
Hematology/Oncology  Progress Note        NAME: Mini Aguirre - ROOM: 219219-A - MRN: [id number] - Age: 47year old - : 1965    Subjective:  No acute events overnight.   Medications:  • sodium chloride       • sodium chloride       • Lidocaine HCl 12.5*   HCT  38.4*   MCV  87.0   MCH  28.3   MCHC  32.5   RDW  14.2   WBC  20.3*   PLT  161     Recent Labs   Lab  06/09/18   0612   06/10/18   0437  06/10/18   1247  06/11/18   0440  06/11/18   1148   GLU  265*  270*   < >  480*  480*  236*  171*  169*

## 2018-06-11 NOTE — PROGRESS NOTES
Critical Care Progress Note     Assessment / Plan:  1. Acute respiratory failure - due to pulm edema vs ARDS  -continue bipap  -wean FiO2 as able  -IVFs stopped due to significant worsening of respiratory status overnight  2.  Sepsis - due to pancreatitis

## 2018-06-11 NOTE — PLAN OF CARE
MD NOTIFICATION    MD Notified: Dr. Wesley Charter    Time: 1700    Reason: patient becoming more restless and lethargic. Oxygen saturations 90-93%. Increased respiratory distress and respiratory rate 40's. New Orders: intubate patient with anesthesia.  Get

## 2018-06-11 NOTE — PLAN OF CARE
HD catheter placed at bedside by Dr. Jin Bar. CXR confirmed placement and OK to use per MD. Dr. Ovidio Murray aware, okay to start CRRT -- order to not remove any fluid at this time. Dialysis RN at bedside to set up CRRT.

## 2018-06-11 NOTE — PLAN OF CARE
Patient: Darrion Gonzalez  MRN: [id number]  : 1965  Allergies:   Seasonal                OTHER (SEE COMMENTS)    Comment:unknown      IR MD/ APN Pre-Procedure Plan  (Inpatients Only)    Date of IR Procedure: 2018  Procedure to be performed: CRRT

## 2018-06-11 NOTE — PROGRESS NOTES
[facility] Pharmacy Note:  Renal Dose Adjustment (CRRT)    Pharmacy has been asked to review medications for appropriateness for CRRT. Renal Replacement fluid rate is 3.5 L/hr.       Patient is currently not receiving antibiotics, therefore no medication adjust

## 2018-06-11 NOTE — PLAN OF CARE
Problem: Patient/Family Goals  Goal: Patient/Family Long Term Goal  Patient's Long Term Goal: return home    Interventions:  - MRI/MRCP of abdomen  - NPO  - fluids  - electrolyte coverage  - See additional Care Plan goals for specific interventions    Outc needed  - Monitor I&O, WT and lab values  - Obtain nutritional consult as needed  - Optimize oral hygiene and moisture  - Encourage food from home; allow for food preferences  - Enhance eating environment   Outcome: Not Progressing      Problem: Kirby Dangelo range  INTERVENTIONS:  - Monitor Blood Glucose as ordered  - Assess for signs and symptoms of hyperglycemia and hypoglycemia  - Administer ordered medications to maintain glucose within target range  - Assess barriers to adequate nutritional intake and ini support. Sister at bedside and updated on plan of care.

## 2018-06-11 NOTE — PROCEDURES
[facility]  Procedure Note    Joyce Lott Patient Status:  Inpatient    1965 MRN [id number]   Location [facility] Attending Edna Quinonez MD   Hosp Day # 3 PCP No primary care provider on file.

## 2018-06-11 NOTE — PROGRESS NOTES
[facility] - [facility]    Progress Note    Adele Puckett Patient Status:  Inpatient    1965 MRN [id number]   Location [facility] 2W/SW Attending Jose Evans MD   Hosp Day # 3 PCP No primary care provider on file.        Subjectiv JEANNA:  - UA: bland with only trace ketones  - Urine Lytes prerenal.  - Will repeat Urine lytes at this time.   - Will check CK levels  - Will obtain Bladder pressure.  - Renal function continues to worsen, respiratory status worsened now needing BIPAP, R

## 2018-06-11 NOTE — PROGRESS NOTES
[facility]    Progress Note    Bárbara Mcclure Patient Status:  Inpatient    1965 MRN [id number]   Location [facility] 2W/SW Attending Ansley Drew MD   Hosp Day # 3 PCP No primary care provider on file.        Subjectiv 06/10/2018   TROP 0.00 06/08/2018   ETOH 3 06/08/2018       Xr Chest Ap Portable  (cpt=71045)    Result Date: 6/11/2018  CONCLUSION:  1. Little change from Liv 10, 2018. 2. Borderline cardiomegaly with small bilateral consolidation/atelectasis.      Dictat

## 2018-06-11 NOTE — PROGRESS NOTES
Vascular Access Note  Inserted by Gabriel Escobedo Rn    Vascular Access Screening:   Allergies to Lidocaine: no  Allergies to Latex: no  Presence of Pacemaker/Defibrillator: No  Mastectomy with Lymph Node Dissection: No  AV Fistula / AV Graft: No  Dialysis Cathete

## 2018-06-11 NOTE — PROGRESS NOTES
[facility] Hospitalist Progress Note     PCP: No primary care provider on file. Chief Complaint: follow-up    Overnight/Interim Events:  SOB, started on bipap    SUBJECTIVE:  Pt laying in bed, some pain, some SOB helped c BIPAP. HR 120s, 105/68.  On insulin g 4.72*  6.17*  8.41*  9.82*   CA  8.0*  8.1*  6.9*  6.5*  6.5*  6.5*  6.8*  6.6*   MG  1.7*   --   2.2   --    --    --    PHOS   --    --   2.6  2.6  2.6   --    --    GLU  265*  270*  379*  480*  480*  236*  171*  169*       Recent Labs   Lab  06/08/18 for CRRT  -check CK    Hyperglycemia  -insulin gtt started, insulin production decreased from above  -follow, can c/s endo if needed.  BG <200 currently; follow closely to ensure not hypoglycemic as npo    Acute respiratory failure  -pulm on, pulm edema pos

## 2018-06-12 NOTE — DIETARY NOTE
ADULT NUTRITION INITIAL ASSESSMENT    Pt is at moderate nutrition risk. Pt does not meet malnutrition criteria. RECOMMENDATIONS TO MD:  RD to initiate EN support once orders received. Plan to use Vital AF 1.2 formula.  If feeds cannot be started or education: assess education needs    - Coordination of nutrition care: team meeting and collaboration with other providers    - Discharge and transfer of nutrition care to new setting or provider: monitor plans    ADMITTING DIAGNOSIS:   Liver function abno CREATSERUM 4.72*  4.72* 6.17*  < > 10.42* 9.27* 8.07* 9.10*   CA 6.5*  6.5* 6.5*  < > 6.2* 5.4* 6.0* 6.3*   MG 2.2  --   --  2.4  --  1.9  --    *  131* 136  < > 138 136 138 139   K 5.7*  5.7* 4.5  < > 4.7 4.5 4.3 5.0   CL 99  99 102  < > 104 105 1

## 2018-06-12 NOTE — CM/SW NOTE
ZAMZAM received for advance directives. Note indicated the pt wanted to select his sister as POA-HC. SW attempted visit, but pt is currently intubated and sedated, receiving CRRT.     MABEL placed call to the pt's sister Fidel Woody [phone] who stated she was en

## 2018-06-12 NOTE — PLAN OF CARE
Diabetes/Glucose Control    • Glucose maintained within prescribed range Progressing          DISCHARGE PLANNING    • Discharge to home or other facility with appropriate resources Progressing          GASTROINTESTINAL - ADULT    • Minimal or absence of na fluid slow up to 100ml/hr. reposition done. updated plan of care to family. blood sugar monitoring with protocol. picc line in place . vitals in limit.will continue to monitor.

## 2018-06-12 NOTE — PROGRESS NOTES
[facility] Hospitalist Progress Note     PCP: No primary care provider on file. Chief Complaint: follow-up    Overnight/Interim Events:  Intubated yesterday    SUBJECTIVE:  Pt laying in bed, intubated. Getting NJ tube placed.     OBJECTIVE:  Temp:  [97.8 °F ( 4.8   < >  5.7*  5.7*  4.5   < >  4.6  4.7  4.5  4.3  5.0   CL  100  101   < >  99  99  102   < >  103  104  105  108  108   CO2  25  27   < >  20*  20*  21*   < >  21*  22  20*  21*  21*   BUN  19  19   < >  49*  49*  60*   < >  89*  96*  88*  77*  86* pain and severe acute gallstone pancreatitis.      Abd pain and severe acute gallstone pancreatitis, elevated LFTs/bili; sepsis  -CT c diffuse pancreatitis, lipase 8300  -MRCP reviewed; endoscopic u/s w/out stones in BD  -NPO, s/p IVF (now backed off d/t p

## 2018-06-12 NOTE — PROGRESS NOTES
[facility]    Progress Note    Felicity Cleveland Patient Status:  Inpatient    1965 MRN [id number]   Location [facility] 2W/SW Attending Fariha Del Rio MD   Hosp Day # 4 PCP No primary care provider on file.        Subjectiv No acute pulmonary consolidation.  Support tubes and catheters are satisfactory     Dictated by (CST): Tong Rutledge MD on 6/11/2018 at 17:43     Approved by (CST): Tong Rutledge MD on 6/11/2018 at 17:47          Xr Chest Ap Portable  (cpt=71045 gregg prerenal.  - Bladder pressure of 19 while awake. - CK levels elevated to 6K, unable to give fluids secondary to respiratory status, will hold off on diuresis at this time. - Likely secondary to a combination of USAMA,CK and Acute pancreatitis.   Ruthie Jones

## 2018-06-12 NOTE — PLAN OF CARE
Problem: PAIN - ADULT  Goal: Verbalizes/displays adequate comfort level or patient's stated pain goal  INTERVENTIONS:  - Encourage pt to monitor pain and request assistance  - Assess pain using appropriate pain scale  - Administer analgesics based on type and tolerated  - Evaluate effectiveness of GI medications  - Encourage mobilization and activity  - Obtain nutritional consult as needed  - Establish a toileting routine/schedule  - Consider collaborating with pharmacy to review patient's medication profil Monitor for signs/symptoms of CO2 retention   Outcome: Not Progressing  Pt orally intubated and on mechanical ventilator. ABG done. Dr. Cleopatra Serrano paged and notified about abg results.  Ordered to increased rate: 18 and repeat abg in AM. Order acknowledge and ca Progressing  Glucose checked Q 2 per protocol.  On insulin gtt for glucose contorl    Problem: SKIN/TISSUE INTEGRITY - ADULT  Goal: Skin integrity remains intact  INTERVENTIONS  - Assess and document risk factors for pressure ulcer development  - Assess and

## 2018-06-12 NOTE — PLAN OF CARE
MD NOTIFICATION    MD Notified: Dr. Lashonda Rowan    Time: 6924    Reason: patient hypotensive at times. On propofol, titrating as able. Patient very restless. BP 91/39 (53). HR sinus tachycardia 130-140.  Not on IVF due to respiratory status and starting CRRT ton

## 2018-06-12 NOTE — PROGRESS NOTES
[facility]    General Surgery Progress Note  Felipe Thierno  OSD:[id number]  HD# 4       Subjective:    Intubated and sedated  Oliguric and on CRRT    Exam:     General: tachypneic and awake and alert  Pulmonary:lungs clear to auscultation bi (cpt=74018)    Result Date: 6/11/2018  CONCLUSION:  1. Reflex ileus. Dictated by (CST): Telly Velasco MD on 6/11/2018 at 13:37     Approved by (CST):  Telly Velasco MD on 6/11/2018 at 13:42          Xr Chest Ap Portable  (cpt=71045)    Result Da 06/11/18   0440  06/11/18   1758  06/12/18   0442  06/12/18   0707   RBC  4.96   --   4.41*   --   3.01*   --    HGB  13.9  13.9   < >  12.5*  11.0*  8.5*  9.9*   HCT  42.8   --   38.4*   --   26.1*   --    MCV  86.3   --   87.0   --   86.5   --    MCH  28

## 2018-06-12 NOTE — PROGRESS NOTES
06/11/18 2340   Provider Notification   Reason for Communication Other (comment); Review case  (Started CRRT)   Test/Procedure Name CRRT   Provider Name Other (comment)  (Dr. Mavis Car - Nephrology)   Method of Communication Call   Response No new orders   N

## 2018-06-12 NOTE — OR NURSING
Dr. Remington Kunz removed OG prior to procedure and then reinserted OG tube post-procedure. Order received for stat KUB for tube placement.

## 2018-06-12 NOTE — PROGRESS NOTES
Orlando Rothman Patient Status:  Inpatient    1965 MRN [id number]   Location [facility] 2W/SW Attending Owen Guo MD   Hosp Day # 4 PCP No primary care provider on file. Critical Care Progress Note      Assessment/Plan:  1.  Ac 06/12/18 0752  Last data filed at 06/12/18 0500   Gross per 24 hour   Intake          1465.68 ml   Output              548 ml   Net           917.68 ml       /69   Pulse 101   Temp 98.7 °F (37.1 °C) (Temporal)   Resp 25   Ht 5' 10\" (1.778 m)   Wt 29 last 168 hours. Recent Labs   Lab  06/08/18   1444  06/11/18   1148   TROP  0.00   --    CK   --   6,340*       Imaging: I independently visualized all relevant chest imaging in PACS, agree with radiology interpretation except where noted.

## 2018-06-12 NOTE — PROGRESS NOTES
06/11/18 2000 06/11/18 2030 06/11/18 2102   Provider Notification   Reason for Communication Other (comment)  (abg results s/p intubation) Review case Change in status; Other (comment)  (Persistent hypotension. BP: 70's - low 80's.  )   Provider Name Joanne

## 2018-06-12 NOTE — PROGRESS NOTES
06/12/18 0300   Provider Notification   Reason for Communication Change in status; Other (comment)  (BP: 70's)   Provider Name Other (comment)  (DR. Ceja)   Method of Communication Call   Response See orders; Other (Comment)  (start levophed)   Notificat

## 2018-06-12 NOTE — PLAN OF CARE
Problem: ALTERED NUTRIENT INTAKE - ADULT  Goal: Nutrient intake appropriate for improving, restoring or maintaining nutritional needs  INTERVENTIONS:  - Assess nutritional status and recommend course of action  - Monitor oral intake when resumed,llabs, and

## 2018-06-12 NOTE — OR NURSING
Patient with multiple drips.  CCU RN in room and adjusting proprofol per Dr. José Miguel Lys request.

## 2018-06-12 NOTE — PROGRESS NOTES
06/12/18 0100   Provider Notification   Reason for Communication Critical value; Other (comment)  (Ca: 5.4)   Provider Name Other (comment)  (Dr. Keiko Carey - Nephrology)   Method of Communication Page   Response See orders; Other (Comment)  (Calcium gluconate

## 2018-06-12 NOTE — H&P
PRE-PROCEDURE UPDATE    HPI: Nancy Chiang is a 47year old male. 8/9/1965. Patient presents for an Esophagogastroduodenoscopy.     ALLERGIES:   Seasonal                OTHER (SEE COMMENTS)    Comment:unknown      No current outpatient prescriptions on

## 2018-06-12 NOTE — PROGRESS NOTES
06/12/18 0700   Provider Notification   Reason for Communication Review case   Provider Name Other (comment)  (Dr. Burton Salmon - Nephrology)   Method of Communication Face to face   Response See orders   Notification Time 0700

## 2018-06-12 NOTE — OPERATIVE REPORT
ESOPHAGOGASTRODUODENOSCOPY REPORT    Patient Name:  Parveen Caldwell Record #: [id number]  YOB: 1965  Date of Procedure: 6/12/2018    Referring physician: No primary care provider on file.     Surgeon:  Roland So MD    Pre-op d

## 2018-06-13 NOTE — PROGRESS NOTES
[facility] Hospitalist Progress Note     PCP: No primary care provider on file. Chief Complaint: follow-up    Overnight/Interim Events:  Fever overnight    SUBJECTIVE:  Pt laying in bed, intubated, sedated.      OBJECTIVE:  Temp:  [97.9 °F (36.6 °C)-102.4 °F 136   < >  138   < >  138  139  138  136  137   K  4.8  4.8  4.8   < >  5.7*  5.7*  4.5   < >  4.7   < >  4.3  5.0  5.2*  4.9  4.9   CL  100  101   < >  99  99  102   < >  104   < >  108  108  104  103  105   CO2  25  27   < >  20*  20*  21*   < >  22   < 1000)     dextrose, acetaminophen, Normal Saline Flush, HYDROmorphone HCl **OR** HYDROmorphone HCl, ipratropium-albuterol, LORazepam, Albuterol Sulfate HFA       Assessment/Plan:      49 year old male with PMH sig for ashtma who p/t [address] [facility] c abd pain and s placed per GI; enteral feeds to start post procedure    Restraints as needed for self-protection    Proph  -hep gtt to restart post procedure     Dispo: ICU, critical condition    D/w CHAPIN Szymanski, Dr. Rhonda Corey and Dr. Janee Weeks, [facility]

## 2018-06-13 NOTE — RESPIRATORY THERAPY NOTE
RESPIRATORY THERAPY PATIENT TRANSPORT NOTE    Transported from: 26  Transported to: Ct          Patient is intubated?:yes  Transport ventilator used?:no  Resuscitation bag used during transport?:yes   ETT placement and ventilator function were verified po

## 2018-06-13 NOTE — PROGRESS NOTES
Merrem (meropenem) 500 mg IV q12h was ordered for popexpert by Dr. Kenneth Rubio. Body mass index is 42.64 kg/m². Wt Readings from Last 6 Encounters:  06/13/18 : 134.8 kg (297 lb 3.2 oz)    Patient is currently receiving CRRT at 2.5 L/hr.      Based on

## 2018-06-13 NOTE — DIETARY NOTE
NUTRITION NOTE UPDATE:    Received consult to initiate EN. Per Rn feeds to start this pm after todays procedure. RD initiated:     - Enteral Nutrition: Vital AF 1.2 at 25 ml/hr per [place] tube.  Recommend advance rate per protocol

## 2018-06-13 NOTE — PROGRESS NOTES
[facility]    General Surgery Progress Note  Anthony Fredy  SII:[id number]  HD# 5       Subjective:    Intubated and sedated  Remains oliguric    Exam:     General: tachypneic and awake and alert  Pulmonary:lungs clear to auscultation bilate Abdomen+pelvis(contrast Only)(cpt=74177)    Result Date: 6/13/2018  CONCLUSION:  1. Progression of necrotizing pancreatitis, including relative hypoenhancement involving the majority of the pancreatic parenchyma with only mild sparing of the tail.  Interval Rosalia Ambriz MD on 6/12/2018 at 14:08     Approved by (CST): Rosalia Ambriz MD on 6/12/2018 at 14:11            Results:     Recent Labs   Lab  06/11/18   0440   06/12/18   0442  06/12/18   0707  06/13/18   0530   RBC  4.41*   --   3.01*   --   3.40*

## 2018-06-13 NOTE — PROGRESS NOTES
Critical Care Progress Note     Assessment / Plan:  1.  Acute respiratory failure - due to pulm edema   -continue mechanical ventilation, pressures are acceptable  -wean PEEP and FiO2 as able  -limit further IVFs  -monitor triglycerides while on propofol  2

## 2018-06-13 NOTE — PROGRESS NOTES
[facility]    [facility] Note    Shiloh Weathers Patient Status:  Inpatient    1965 MRN [id number]   Location [facility] 2W/SW Attending Lisy Caicedo MD   Hosp Day # 5 PCP No primary care provider on file.        Subjectiv Interval increase in diffuse peripancreatic and central mesenteric inflammatory stranding with concomitant increase in free fluid/ascites throughout the abdomen.  This is most pronounced along the greater curvature of the stomach, where there may be a devel the stomach. 2. Slightly increased left basilar opacification.      Dictated by (CST): Chandler Lai MD on 6/12/2018 at 8:52     Approved by (CST): Mamta Tinajero MD on 6/12/2018 at 8:54          Xr Chest Ap Portable  (cpt=71045)    Resul target to 125cc/hr as tolerated. - I Discussed yesterday with Dr. Kenia Porter, patient has severe ATN from acute pancreatitis and USAMA on CRRT at this time.  Contrast will likely delay renal recovery but given patients status and necessity of needing contrast,

## 2018-06-13 NOTE — PROCEDURES
[facility]  Procedure Note    Bárbara Mcclure Patient Status:  Inpatient    1965 MRN [id number]   Location [facility] Attending Ananya Bailey, [address] Day # 5 PCP No primary care provider on file.      Proc

## 2018-06-13 NOTE — PLAN OF CARE
Diabetes/Glucose Control    • Glucose maintained within prescribed range Progressing        DISCHARGE PLANNING    • Discharge to home or other facility with appropriate resources Progressing        GASTROINTESTINAL - ADULT    • Minimal or absence of nausea noted feeding tube is kinked. spoke with  and plan for reposition tomorrow. at 1610 CRRT stopped due to line clotted. informed dialysis and informed DR. Salazar. new order for blood culture received. updated family plan of care. at [address] [facility] dialysis nurse at

## 2018-06-13 NOTE — PROGRESS NOTES
[place] [facility] - [facility]    Progress Note    Jason Rasmussen Patient Status:  Inpatient    1965 MRN [id number]   Location [facility] 2W/SW Attending Gian Koenig, [address] [facility] Day # 5 PCP No primary care provider on file.        Subjective:   Anita Pastor by (CST): Mohit Fuentes MD on 6/11/2018 at 13:42          Ct Abdomen+pelvis(contrast Only)(cpt=74177)    Result Date: 6/13/2018  CONCLUSION:  1.  Progression of necrotizing pancreatitis, including relative hypoenhancement involving the majority of the p nasojejunal tube with tip in the proximal jejunum     Dictated by (CST): Elizabeth Leon MD on 6/12/2018 at 14:08     Approved by (CST): Elizabeth Leon MD on 6/12/2018 at 14:11          Xr Chest Ap Portable  (cpt=71045)    Result Date: 6/12/2018  CONCLU

## 2018-06-13 NOTE — PLAN OF CARE
Problem: Patient/Family Goals  Goal: Patient/Family Long Term Goal  Patient's Long Term Goal: return home    Interventions:  - MRI/MRCP of abdomen  - NPO  - fluids  - electrolyte coverage  - See additional Care Plan goals for specific interventions    Outc precautions as indicated by assessment.  - Educate pt/family on patient safety including physical limitations  - Instruct pt to call for assistance with activity based on assessment  - Modify environment to reduce risk of injury  - Provide assistive device nutritional intake (undernourished)  INTERVENTIONS:  - Monitor percentage of each meal consumed  - Identify factors contributing to decreased intake, treat as appropriate  - Assist with meals as needed  - Monitor I&O, WT and lab values  - Obtain nutritiona to report SOB or any respiratory difficulty  - Respiratory Therapy support as indicated  - Manage/alleviate anxiety  - Monitor for signs/symptoms of CO2 retention   Outcome: Not Progressing  Pt is still on the vent and maintain O2 saturations, Peep was inc

## 2018-06-13 NOTE — PROGRESS NOTES
Patient seen at bedside for US guided drain placement. Patient is vented on propofol drip. Timeout/universal protocol completed. 5ml of 2% lidocaine given by MD to left abdomen.   Patient monitored and is vented, VSS during procedure with BP in 90s and CR

## 2018-06-14 NOTE — PLAN OF CARE
Safety Risk - Non-Violent Restraints    • Patient will remain free from self-harm Not Progressing        Continues to pull at lines; restraints maintained.

## 2018-06-14 NOTE — PROGRESS NOTES
[facility]    Progress Note    Salvador Zepeda Patient Status:  Inpatient    1965 MRN [id number]   Location [facility] 2W/SW Attending Ricky Lobo MD   Hosp Day # 6 PCP No primary care provider on file.        Subjectiv the abdomen. This is most pronounced along the greater curvature of the stomach, where there may be a developing pseudocyst. However, no well-defined rim-enhancing fluid collection to suggest abscess is apparent.  2. Attenuation and thrombosis at the superi 6/14/2018 at 8:58          Ir Miscellaneous    Result Date: 6/13/2018  CONCLUSION:  1. Successful placement of drainage catheter into perigastric fluid collection under ultrasound guidance. Sample aspirate has been submitted for microbiologic analysis.

## 2018-06-14 NOTE — PLAN OF CARE
- Hgb trending downward;  MD notified.  H&H at 11pm  - Dialysis catheter replaced at bedside today by Dr. Daniela Benitez  - Dr. Sania Fu maneuvered keofeed today; tube feeding started  - CRRT restarted at 12:30- order is not to remove but keep balanced with I & O's Progressing        SAFETY ADULT - FALL    • Free from fall injury Progressing        SKIN/TISSUE INTEGRITY - ADULT    • Skin integrity remains intact Progressing

## 2018-06-14 NOTE — PROGRESS NOTES
[facility]    Progress Note    Rita Lynn Patient Status:  Inpatient    1965 MRN [id number]   Location [facility] 2W/SW Attending Lorna Khan DO   Hosp Day # 6 PCP No primary care provider on file.        Subjective:   Ros Stallworth stranding with concomitant increase in free fluid/ascites throughout the abdomen.  This is most pronounced along the greater curvature of the stomach, where there may be a developing pseudocyst. However, no well-defined rim-enhancing fluid collection to sug Kapil Tinajero MD on 6/14/2018 at 8:55     Approved by (CST): Edwin Skinner MD on 6/14/2018 at 8:58          Ir Miscellaneous    Result Date: 6/13/2018  CONCLUSION:  1.  Successful placement of drainage catheter into perigastric fluid col

## 2018-06-14 NOTE — PROGRESS NOTES
Patient in IR for Temp Dialysis Cath exchange due to non-functioning line. Procedure completed at patient bedside. Timeout/universal protocol completed. 5ml of 2% lidocaine given by MD to right neck. Patient monitored and vented.   Catheter exchanged fo

## 2018-06-14 NOTE — PROGRESS NOTES
[facility] Hospitalist Progress Note     PCP: No primary care provider on file. Chief Complaint: follow-up    Overnight/Interim Events:  Fever overnight    SUBJECTIVE:  Pt laying in bed, intubated, sedated.      OBJECTIVE:  Temp:  [98.5 °F (36.9 °C)-99.8 °F ( 0504   NA  131*  131*  136   < >  138   < >  138   < >  136  137  138  138  140   K  5.7*  5.7*  4.5   < >  4.7   < >  4.3   < >  4.9  4.9  5.0  5.0  5.1   CL  99  99  102   < >  104   < >  108   < >  103  105  105  104  107   CO2  20*  20*  21*   < >  22 Continuous dose Heparin infusion 2,200 Units/hr (06/14/18 1200)     dextrose, acetaminophen, Normal Saline Flush, HYDROmorphone HCl **OR** HYDROmorphone HCl, ipratropium-albuterol, LORazepam, Albuterol Sulfate HFA       Assessment/Plan:      49 year old ma production decreased from above  -follow, can c/s endo if needed.  BG <200 currently; follow closely to ensure not hypoglycemic as npo    Sinus Tachycardia - improved  -2/2 above    ?concern for tachyarrhythmia  -recorded on rhythm strip, resolved  -EKG obt

## 2018-06-14 NOTE — PROGRESS NOTES
RESPIRATORY THERAPY MECHANICAL VENTILATION PROGRESS NOTE    Ventilator Weaning:  Patient meets criteria for weaning? no Weaning was attempted no.  Sedated, Peep 8, going to start CRRT.         06/14/18 0840   Readings   Total RR 23   Minute Ventilation (L/m

## 2018-06-14 NOTE — CM/SW NOTE
CARE MANAGEMENT    Discharge Planning Evaluation: Hospital day #6  Saw pt at bedside for initial discharge planning. Pt is vented,sedated on multiple drips. Sister Rosa Mason is at bedside, the key contact and spokesperson for pt.   Pt was in [facility]

## 2018-06-14 NOTE — PROGRESS NOTES
CRRT was stopped, lack of arterial flow, lines had TPA done just prior to restart, blood return and flushing with no resistance. Spoke to Dr Lily Drew, he wants IR to exchange Catheter in am and then resume CRRT.

## 2018-06-14 NOTE — PROGRESS NOTES
[facility]    General Surgery Progress Note  Angeline Card  EEW:[id number]  HD# [id number]       Subjective:    Intubated and sedated  UO improved  Feeding tube kinked and could not flush  I pulled back and readjusted and reinserted and CXR showed po Date: 6/14/2018  CONCLUSION:  1. Successful placement of nasogastric feeding tube into the transverse duodenum.      Dictated by (CST): Darius Irving MD on 6/14/2018 at 8:55     Approved by (CST): Darius Irving MD on 6/14/2018 at 8:58 AM

## 2018-06-14 NOTE — PLAN OF CARE
Diabetes/Glucose Control    • Glucose maintained within prescribed range Progressing        DISCHARGE PLANNING    • Discharge to home or other facility with appropriate resources Progressing        GASTROINTESTINAL - ADULT    • Minimal or absence of nausea cath. Dr Kayy Bustos would like IR to replace the cath in the am. Family at [facility] and updated on POC. RIVAS drain was place and 45CC over night.

## 2018-06-14 NOTE — PROGRESS NOTES
ASSESSMENT/PLAN:    Impression: 1. Tachycardia in a 81-year-old male with multisystem failure and on pressors. This was brief and may have been an artifact.   His baseline EKG is normal.  There is no evidence or history of heart issues in the past.  2. CAD: No per the old records   Social History:  Smoking status: Never Smoker                                                              Smokeless tobacco: Never Used                      Alcohol use:  Yes              Comment: couple times a week       REV

## 2018-06-15 NOTE — PROGRESS NOTES
[place] [facility] - [facility]    Progress Note    Nikoan Ready Patient Status:  Inpatient    1965 MRN [id number]   Location [facility] 2W/SW Attending Todd Huertas MD   Hosp Day # 7 PCP No primary care provider on file.        Subjectiv Date: 6/13/2018  CONCLUSION:  1. Progression of necrotizing pancreatitis, including relative hypoenhancement involving the majority of the pancreatic parenchyma with only mild sparing of the tail.  Interval increase in diffuse peripancreatic and central mes at 10:41     Approved by (CST): Ann Marie Tinajero MD on 6/14/2018 at 10:44          Xr Chest Ap Portable  (cpt=71045)    Result Date: 6/14/2018  CONCLUSION:  1. Successful placement of nasogastric feeding tube into the transverse duodenum.      Dictat

## 2018-06-15 NOTE — PROGRESS NOTES
Paged cardiology about -120s. Not worried unless over 125-130. No orders given. Will continue to monitor.

## 2018-06-15 NOTE — PROGRESS NOTES
RESPIRATORY THERAPY MECHANICAL VENTILATION PROGRESS NOTE    Ventilator Weaning:  Patient meets criteria for weaning? No plans for weaning today.     Current Ventilator Data:     06/15/18 0800 06/15/18 1115 06/15/18 1400   [facility]   Ventilator Initi

## 2018-06-15 NOTE — PLAN OF CARE
Safety Risk - Non-Violent Restraints    • Patient will remain free from self-harm Not Progressing        Continues to reach for lines/ ett.

## 2018-06-15 NOTE — PLAN OF CARE
- Remains intubated until Sunday or Monday per Pulmonary  - Propofol off for now, continue with Precedex  - Insulin & Heparin gtts maintained  - Keofeed occluded and removed by GI physician. Bridle left in place. TPN ordered.   - Surgical MD wants [facility] Skin integrity remains intact Progressing

## 2018-06-15 NOTE — PLAN OF CARE
Problem: ALTERED NUTRIENT INTAKE - ADULT  Goal: Nutrient intake appropriate for improving, restoring or maintaining nutritional needs  INTERVENTIONS:  - Assess/reassess nutritional status and recommend course of action  - Monitor oral intake when resumed,l

## 2018-06-15 NOTE — PROGRESS NOTES
[facility]    General Surgery Progress Note  Yahir Lucero  FMY:[id number]  HD# 7       Subjective:    Intubated and sedated  UO improved  Feeding tube kinked and could not flush - removed by Dr. Chiquita Figueroa    Exam:     General: tachypneic and a tube coiled in the distal duodenum or proximal jejunum.  2. If the tube is withdrawn approximately 8.5 cm, the tip of the tube would have more optimal positioning     Dictated by (CST): Ella Mcghee MD on 6/14/2018 at 21:06     Approved by (CST): Ridge Salamanca

## 2018-06-15 NOTE — PROGRESS NOTES
[facility] Hospitalist Progress Note     PCP: No primary care provider on file. Chief Complaint: follow-up    Overnight/Interim Events:  Transfused 1 unit prbc for hgb <7    SUBJECTIVE:  Pt laying in bed, intubated, sedated.      OBJECTIVE:  Temp:  [97.5 °F ( --    --    --   1.1   --     < > = values in this interval not displayed.        Recent Labs   Lab  06/10/18   5595   06/11/18   1758   06/12/18   0448   06/13/18   0530   06/13/18   1741  06/14/18   8407  06/14/18   1625  06/14/18   2228  06/15/18   053 Intravenous Once   • meropenem  1 g Intravenous Q12H   • Chlorhexidine Gluconate  15 mL Mouth/Throat [email or web address]   • pantoprazole (PROTONIX) IV push  40 mg Intravenous Q24H     • dextrose     • norepinephrine 1 mcg/min (06/13/18 2030)   • NxStage Pure Fl portal, and splenic veins  -noted on MRI, starting heparin gtt post procedure, heme following  -likely need AC 3-6 months    Anemia,no obvious source of blood loss  -hgb trending down 9.7->7.7->6.7->1 unit prbc ->7.2  -could be due to bloody pancreatic flu

## 2018-06-15 NOTE — PROGRESS NOTES
[facility]    Progress Note    Yahir Lucero Patient Status:  Inpatient    1965 MRN [id number]   Location [facility] 2W/SW Attending Radha Flores,    Hosp Day # 7 PCP No primary care provider on file.        Subjective:   Alice Hem more optimal positioning     Dictated by (CST): Rosalia Ambriz MD on 6/14/2018 at 21:06     Approved by (CST): Rosalia Ambriz MD on 6/14/2018 at 21:11          Xr Chest Ap Portable  (cpt=71045)    Result Date: 6/14/2018  CONCLUSION:  1.  Distal right in

## 2018-06-15 NOTE — PROGRESS NOTES
ASSESSMENT/PLAN:    Impression: 1. Tachycardia in a 44-year-old male with multisystem failure and on pressors. This was brief and may have been an artifact.   His baseline EKG is normal.  There is no evidence or history of heart issues in the past.  Th developed, well nourished. WEIGHT:HEAD/FACE:no trauma, normocephalic. EYES:conjunctiva not injected, no xanthelasma. ENT:mucosa pink and moist. NECK:jugular venous pressure not elevated. RESP:normal rate and rhythm, clear to auscultation.  GI:soft, firm dis

## 2018-06-15 NOTE — PROGRESS NOTES
Hematology/Oncology  Progress Note        NAME: Nancy Chiang - ROOM: 219/219-A - MRN: [id number] - Age: 47year old - : 1965    Subjective:  No acute events overnight.   Medications:  • sodium chloride   Intravenous Once   • meropenem  1 g Intraven No thrush noted. Lungs: clear to ascultation bilaterally    Heart: Regular rate and rhythm, normal S1S2, no murmur. Abdomen: soft, non-tender, non-distended, positive BS. Extremity: no edema   Skin: No rashes or lesions.     Recent Labs   Lab  06/15/1 Portal, mesenteric and splenic vein thrombosis  - Given renal function continue heparin, can be transitioned to coumadin upon stability.      3. JEANNA  -Per renal  -on HD     Will follow along peripherally, please call with questions.      Tasha Mcdermott

## 2018-06-15 NOTE — PROGRESS NOTES
Uriel Woodall Patient Status:  Inpatient    1965 MRN [id number]   Location [facility] 2W/SW Attending Brook Wilson, DO   Hosp Day # 7 PCP No primary care provider on file. Critical Care Progress Note      Assessment/Plan:  1.  Acute resp hours) at 06/15/18 0805  Last data filed at 06/15/18 0700   Gross per 24 hour   Intake           3527.5 ml   Output             2952 ml   Net            575.5 ml       BP 92/52   Pulse 81   Temp 97.7 °F (36.5 °C) (Temporal)   Resp 19   Ht 5' 10\" (1.778 m) this interval not displayed. Recent Labs   Lab  06/12/18   1006   ABGPHT  7.31*   KIQJGE2S  45   SSGUP5Y  167*   ABGHCO3  22.0   SITE  Left Radial   THGB  10.3*     No results for input(s): BNP in the last 168 hours.   Recent Labs   Lab  06/08/18   4848

## 2018-06-15 NOTE — PLAN OF CARE
METABOLIC/FLUID AND ELECTROLYTES - ADULT    • Glucose maintained within prescribed range Not Progressing          Diabetes/Glucose Control    • Glucose maintained within prescribed range Progressing        DISCHARGE PLANNING    • Discharge to home or other

## 2018-06-15 NOTE — PLAN OF CARE
PAIN - ADULT    • Verbalizes/displays adequate comfort level or patient's stated pain goal Not Progressing        Patient/Family Goals    • Patient/Family Long Term Goal Not Progressing          Diabetes/Glucose Control    • Glucose maintained within presc

## 2018-06-15 NOTE — DIETARY NOTE
ADULT NUTRITION REASSESSMENT     Pt is at high nutrition risk. Pt does not meet malnutrition criteria. RECOMMENDATIONS TO MD:  CPN as ordered in ~ minimal volume. Renal function panel BID is already ordered. Additional Mg++ for am ordered.  Potentia with Goal rate: 65 ml/hr + Prosource 2 packets BID to provide pt ~1875 kcals, 150 gm protein, 2 gm prot/kg IBW . May adjust pending other caloric contribution (weaning off propofol today)      Medical Food Supplements-NPO    - Vitamin and mineral supplement Preferences: Not Obtained    MEDICATIONS: reviewed  Insulin GTT in process (~5U/hr). Weaning off propofol. Precedex, heparin. IVF intake ~ ml/hr with current meds.    • sodium chloride   Intravenous Once   • meropenem  1 g Intravenous Q12H   • Chlorhe nutrition initiation, TPN tolerance, propofol rate, adequacy of TPN and for TPN adjustment    - Anthropometric Measurement:      Monitor: wt     - Nutrition Goals:      allow wt loss due to fluid losses, TPN to meet greater than 80% nutrition needs, labs a

## 2018-06-16 NOTE — CONSULTS
[facility] AND [facility] Infectious Disease  Report of Consultation    Adele Puckett Patient Status:  Inpatient    1965 MRN [id number]   Location [facility] 2W/SW Attending John Amor DO   Hosp Day # 8 PCP No primary care provider on file. mL MBP, 1 g, Intravenous, Q12H  •  acetaminophen (OFIRMEV) infusion 1,000 mg, 1,000 mg, Intravenous, Q8H PRN  •  norepinephrine (LEVOPHED) 4 mg/250 ml premix infusion, 0.5-30 mcg/min, Intravenous, Continuous  •  NxStage Pure Flow 400 CRRT fluid 5000mL, 3.5 exertion, or stridor. Cardiovascular: Negative for chest pain, palpitations, irregular heart beats. Gastrointestinal:  Abdominal pain. Genitourinary:  No dysuria, hematuria, urine urgency or frequency.    Integument/breast: Negative for rash, skin les 06/15/18 2100 123/69 - - 102 18 97 % -   06/15/18 2000 144/76 98.5 °F (36.9 °C) Temporal 104 19 100 % -   06/15/18 1900 156/72 - - 111 20 100 % -   06/15/18 1800 134/61 - - 98 19 99 % -   06/15/18 1700 135/67 - - 99 18 99 % -         Intake/Output:  I/O well-defined rim-enhancing fluid collection to   suggest abscess is apparent. 2. 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  The majority of the splenic vein is atten could be due to SMV thrombus as well. D/w nursing at length. Will follow closely along with you. Prognosis guarded. Patricia Lebron [facility] Infectious Disease  [phone]    6/16/2018  4:37 PM

## 2018-06-16 NOTE — OPERATIVE REPORT
ENDOSCOPY OPERATIVE REPORT    Patient Name: Nehemiah Vazquez  Medical Record #: [id number]  YOB: 1965  Date of Procedure: 6/16/2018    Preoperative Diagnosis: need for enteral nutrition; severe necrotizing pancreatitis.    Postoperative Diagnosis GE-junction distance from external measurements. As the scope was being withdrawn, the NJ tube was concomitantly coming out and would not stay in place.  The suture was then clipped (note: not the tube) to the small bowel mucosa using a resolution clip to s

## 2018-06-16 NOTE — PROGRESS NOTES
[facility]    Progress Note    Marita Davis Patient Status:  Inpatient    1965 MRN [id number]   Location [facility] 2W/SW Attending Vicetne Gamble MD   Hosp Day # 8 PCP No primary care provider on file.        Subjectiv 6/16/2018  CONCLUSION:   No significant change. Hypoinflated study with layering left effusion and associated atelectasis. The enteric tube location is indeterminate. The tip may be within the distal esophagus.   Dictated by (CST): Bartolo Clark MD on

## 2018-06-16 NOTE — PROGRESS NOTES
GI  PROGRESS NOTE    SUBJECTIVE:  Opens eyes. OBJECTIVE:  Temp:  [98.2 °F (36.8 °C)-100 °F (37.8 °C)] 99 °F (37.2 °C)  Pulse:  [] 115  Resp:  [18-25] 24  BP: ()/(56-86) 150/73  FiO2 (%):  [40 %-55 %] 40 %  Exam  Gen: intubated; nad.    Pul 2.9*   --    --    --    --   2.4   PHOS   --    --   3.8   --   5.9*   < >  6.3*  5.8*  5.9*  5.5*  4.5  4.2   GLU  180*   < >  184*   < >  169*   < >  193*  169*  157*  143*  155*  179*    < > = values in this interval not displayed.        Recent Labs CBD stones. 6/13/18 -- s/p placement of 14 Fr drain in perigastric fluid. On meropenem. 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    3.) Acute renal failure--CRRT  4.) Hyperglycemia secondary to severe pancreatitis

## 2018-06-16 NOTE — PROGRESS NOTES
Yrn Qui[address]ones [address] Patient Status:  Inpatient    1965 MRN [id number]   Location [facility] 2W/SW Attending Catalina Gomez,    Hosp Day # 8 PCP No primary care provider on file.      Critical Care Progress Note     Date of Admissi Intravenous, Q2H PRN **OR** HYDROmorphone HCl (DILAUDID) 1 MG/ML injection 1 mg, 1 mg, Intravenous, Q2H PRN  •  ipratropium-albuterol (DUONEB) nebulizer solution 3 mL, 3 mL, Nebulization, Q2H PRN  •  LORazepam (ATIVAN) injection 0.5 mg, 0.5 mg, Intravenous 06/15/2018    06/15/2018   CO2 25 06/15/2018   BUN 76 06/15/2018   CREATSERUM 3.03 06/15/2018    06/15/2018   CA 8.0 06/15/2018   ALKPHO 78 06/16/2018   ALT 35 06/16/2018   AST 72 06/16/2018   BILT 1.2 06/16/2018   ALB 1.9 06/16/2018   TP 6.1 medical state, will transfuse 1u PRBC now  -leukocytosis- uptrending. This is nonspecific, could be due to underlying infection or from the pancreatitis itself. Repeat blood cultures and continue to monitor on meropenem.   Nothing to indicate resistant in

## 2018-06-16 NOTE — PROGRESS NOTES
[facility] Hospitalist Progress Note     PCP: No primary care provider on file.     Chief Complaint: follow-up    Overnight/Interim Events:  Appeared to have agonal breathing, ABG obtained and not markedly abn  CRRT stopped as access clotted again    SUBJECTIVE: --   216   --   174   BAND   --    < >   --    --   18*   --   12*   --   22*   --    --    --   21*   --   30*   INR  1.3*   --   1.2   --   1.6*   --   1.2   --    --    --    --   1.1   --    --    --     < > = values in this interval not displayed. hours.      Meds:     • Sodium Chloride       • sodium chloride   Intravenous Once   • meropenem  1 g Intravenous Q12H   • Chlorhexidine Gluconate  15 mL Mouth/Throat [email or web address]   • pantoprazole (PROTONIX) IV push  40 mg Intravenous Q24H     • fentanyl growth thus far on cx, If no growth may pull drain  -continue meropenem  -off pressors    Worsening leukocytosis  -continue meropenem for now, fluid cx negative  -d/w ID  who was consulted    Acute respiratory failure   -pulm on, pulm edema post

## 2018-06-16 NOTE — PLAN OF CARE
Discharge to home or other facility with appropriate resources Not Progressing    Pt not ready for discharge  Maintains or returns to baseline bowel function Not Progressing    Anticipated placement of nj tube by MD w/ endoscopic guidance today.  Abdomen re

## 2018-06-16 NOTE — PROGRESS NOTES
[facility]    General Surgery Progress Note  Maria Del Carmen Beyer  FQI:[id number]  HD# 8       Subjective:    Intubated and less sedated - responds to yes/no questions  UO improved 890/24 hours  Feeding tube removed by Dr. Remington Kunz yest  On TPN with 0530   06/14/18   0504   06/14/18   2228  06/15/18   0745  06/15/18   1529   RBC  3.40*   --   2.69*   --    --   2.52*   --    HGB  9.7*   < >  7.7*   < >  6.7*  7.2*  7.2*   HCT  29.1*   --   23.5*   < >  20.6*  21.9*  22.5*   MCV  85.6   --   87.5   --

## 2018-06-16 NOTE — PLAN OF CARE
Problem: PAIN - ADULT  Goal: Verbalizes/displays adequate comfort level or patient's stated pain goal  INTERVENTIONS:  - Encourage pt to monitor pain and request assistance  - Assess pain using appropriate pain scale  - Administer analgesics based on type appropriate  - Advance diet as tolerated, if ordered  - Obtain nutritional consult as needed  - Evaluate fluid balance   Outcome: Progressing    Goal: Maintains or returns to baseline bowel function  INTERVENTIONS:  - Assess bowel function  - Maintain adeq Spirometry  - Assess the need for suctioning and perform as needed  - Assess and instruct to report SOB or any respiratory difficulty  - Respiratory Therapy support as indicated  - Manage/alleviate anxiety  - Monitor for signs/symptoms of CO2 retention   O as soon as possible  - Assess the patient's physical comfort, circulation, skin condition, hydration, nutrition and elimination needs   - Reorient and redirection as needed  - Assess for the need to continue restraints   Outcome: Not Progressing      Probl

## 2018-06-16 NOTE — PROGRESS NOTES
06/16/18 1125   Readings   Total RR 22   Minute Ventilation (L/min) 11.9 L/min   Expiratory Tidal Volume 520 mL   PIP Observed (cm H2O) 22 cm H2O   MAP (cm H2O) 8   Auto PEEP Observed (cm H2O) 0 cm H2O   PEEP Low (cm H2O) 2 cm H2O   Plateau Pressure (cm

## 2018-06-16 NOTE — INTERVAL H&P NOTE
Pre-op Diagnosis: none given    The above referenced H&P was reviewed by Manfred Daily MD on 6/16/2018, the patient was examined and no significant changes have occurred in the patient's condition since the H&P was performed.   I discussed with the patient an

## 2018-06-17 NOTE — PROGRESS NOTES
[facility] AND [facility] Infectious Disease  Progress Note    Felicity Cleveland Patient Status:  Inpatient    1965 MRN [id number]   Location [facility] 2W/SW Attending Moisés aMldonado MD   Hosp Day # 9 PCP No primary care provider on file. inflammatory stranding with concomitant increase in free fluid/ascites throughout the abdomen.  This is most pronounced along the greater curvature of the stomach, where there may be a developing pseudocyst. However, no well-defined rim-enhancing fluid gilda 6/11, pulmonary following     4. ARF now on HD  - Due to above, severe sepsis and pancreatitis  - CRRT started, urine output picking up     5. Disposition - inpatient. Continue meropenem. Add IV micafungin given continued temps.   Monitor for any diarrh

## 2018-06-17 NOTE — PLAN OF CARE
Problem: PAIN - ADULT  Goal: Verbalizes/displays adequate comfort level or patient's stated pain goal  INTERVENTIONS:  - Encourage pt to monitor pain and request assistance  - Assess pain using appropriate pain scale  - Administer analgesics based on type ordered medications to maintain glucose within target range  - Assess barriers to adequate nutritional intake and initiate nutrition consult as needed  - Instruct patient on self management of diabetes    Outcome: Progressing  Insulin drip continued per pr skin condition, hydration, nutrition and elimination needs   - Reorient and redirection as needed  - Assess for the need to continue restraints   Outcome: Progressing  Pt moves arms more when becomes more agitated and in pain.   Pt on vent and has OG and NJ

## 2018-06-17 NOTE — PROGRESS NOTES
RESPIRATORY THERAPY MECHANICAL VENTILATION PROGRESS NOTE    Ventilator Weaning:  Patient meets criteria for weaning? No plans for weaning today.     Current Ventilator Data:     06/17/18 0715 06/17/18 1100 06/17/18 1415   Vent Information   Vent Mode VC/AC

## 2018-06-17 NOTE — PROGRESS NOTES
GI  PROGRESS NOTE    SUBJECTIVE: notes reviewed.      OBJECTIVE:  Temp:  [98.6 °F (37 °C)-100.8 °F (38.2 °C)] 100.8 °F (38.2 °C)  Pulse:  [106-129] 114  Resp:  [21-32] 30  BP: (100-150)/(52-94) 101/62  FiO2 (%):  [40 %-50 %] 40 %  Exam  Gen: intubated; na 193*   < >  143*  155*  179*  182*  192*    < > = values in this interval not displayed.        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    3.) Acute renal failure--CRRT  4.) Hyperglycemia secondary to severe pancreatitis   5.) Acute lung injury -- per Dr. Sarai villasenor   6.) FEN -- malpositioned/malfunctioning NJ-tube --> removed --> replaced end

## 2018-06-17 NOTE — PROGRESS NOTES
[address] Patient Status:  Inpatient    1965 MRN [id number]   Location [facility] 2W/SW Attending Linda Lino DO   Hosp Day # 9 PCP No primary care provider on file.      Critical Care Progress Note     Date of Admissi Continuous  •  phenylephrine HCl (KRISHNA-SYNEPHRINE) 50 mg in sodium chloride 0.9 % 250 mL infusion, 100-200 mcg/min, Intravenous, Continuous  •  Insulin Regular Human (NOVOLIN R) 100 Units in sodium chloride 0.9 % 100 mL infusion, 1-28 Units/hr, Intravenous, hypoactive BS, tympanitic, no organomegaly  Extremities: edema + c/w anasarca       Lab Results  Component Value Date   WBC 31.3 06/17/2018   RBC 2.74 06/17/2018   HGB 8.0 06/17/2018   HCT 24.3 06/17/2018   MCV 88.7 06/17/2018   MCH 29.3 06/17/2018   MCHC drain into peripancreatic fluid collection, minimal output  -abx as above  -pain management  -mgt per surgery and GI   4. 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   Like

## 2018-06-17 NOTE — PROGRESS NOTES
[facility]    [facility]    Jason Rasmussen Patient Status:  Inpatient    1965 MRN [id number]   Location [facility] 2W/SW Attending Ayush Cheney MD   Hosp Day # 9 PCP No primary care provider o SMV thrombosis. Final duration of AC to be determined after acute issues resolved    #pancreatic insufficiency / hyperglycemia  -insulin drip  -check a1c    #renal  -Dr. Salas Carmona on consult.   CRRT per renal.  Patient with significant anasarca       DVT ppx: Intravenous Continuous PRN   dextrose 10 % infusion  Intravenous Continuous PRN   meropenem (MERREM) 1 g in sodium chloride 0.9% 100 mL MBP 1 g Intravenous Q12H   acetaminophen (OFIRMEV) infusion 1,000 mg 1,000 mg Intravenous Q8H PRN   norepinephrine (LEVO 20.6*  21.9*  22.5*  21.9*   --   24.3*   MCV  85.6   --   87.5   --    --   86.9   --   88.6   --   88.7   MCH  28.4   --   28.5   --    --   28.5   --   28.5   --   29.3   MCHC  33.2   --   32.5   --    --   32.8   --   32.2   --   33.0   RDW  15.0   -- terminate in the distal stomach. 2. Interval placement of a percutaneous pigtail drainage catheter in the left upper quadrant. There has been interval decrease in the quantity of complex fluid marginating the stomach.   3. Advanced necrotizing pancreatitis decreased since prior exams, compatible with interval drainage in this region. There is a developing loculation of fluid measuring 2.5 x 2.1 cm along the anteroinferior margin of the pancreas (series 5 and image  25).  No additional well-defined/drainable c approximately 4.3 cm from the fatou. Left-sided PICC line tip last seen projecting near the cavoatrial junction. Multiple tubes and lines overlie the patient, limiting evaluation. HEART/VASC: Cardiomediastinal silhouette unchanged. Aorta is tortuous.  Incr The enteric tube location is indeterminate. The tip may be within the distal esophagus.   Dictated by (CST): Ilda Richey MD on 6/16/2018 at 8:00     Approved by (CST): Ilda Richey MD on 6/16/2018 at 8:04

## 2018-06-17 NOTE — PLAN OF CARE
Patient: Hortensia Plate  MRN: [id number]  : 1965  Allergies:   Seasonal                OTHER (SEE COMMENTS)    Comment:unknown      IR MD/ APN Pre-Procedure Plan  (Inpatients Only)    Date of IR Procedure: 2018  Procedure to be performed: Francisco Flores

## 2018-06-17 NOTE — PROGRESS NOTES
[facility]    General Surgery Progress Note  Nancy Chiang  ELD:[id number]  HD# 9       Subjective:    Intubated and sedated  UO improved 1810/24 hours but still requiring CRRT  Feeding tube reinserted yesterday   On TPN with glu controlled at 06/17/18 0946   Gross per 24 hour   Intake          3424.88 ml   Output             2345 ml   Net          1079.88 ml       Ct Abdomen+pelvis(cpt=74176)    Result Date: 6/17/2018  CONCLUSION:  1.  The nasojejunal tube terminates in the second portion of in the hypopharynx. There is a left upper quadrant pigtail drainage catheter. Right IJ central venous catheter tip projects over the right atrium. Endotracheal tube tip projects approximately 4.3 cm from the fatou.  Left-sided PICC line tip last seen proje Results:     Recent Labs   Lab  06/15/18   0745  06/15/18   1529  06/16/18   0536  06/16/18   1453  06/17/18   0314   RBC  2.52*   --   2.47*   --   2.74*   HGB  7.2*  7.2*  7.0*  7.8*  8.0*   HCT  21.9*  22.5*  21.9*   --   24.3*   MCV  86.9   --

## 2018-06-17 NOTE — PROGRESS NOTES
[facility]    Progress Note    Salvador Zepeda Patient Status:  Inpatient    1965 MRN [id number]   Location [facility] 2W/SW Attending Ricky Lobo MD   Hosp Day # 9 PCP No primary care provider on file.        Subjectiv with progressive increase in extensive peripancreatic fluid. Overall assessment is suboptimal on this noncontrast examination. 4. Slight interval increase in intraperitoneal ascites. 5. Cholelithiasis is apparent.   6. Small to moderate left larger than r study. Patchy bibasilar opacities. No significant change. PLEURA: No significant effusion on a single view. No pneumothorax. BONES: No visible acute osseous lesion.   CONCLUSION:   Enteric tube tip projects over the gastric air bubble in the left upper leesa levels trending down to 2K this morning.  - Likely secondary to a combination of USAMA,CK and Acute pancreatitis. - UOP stable  - Renal function worsened off of CRRT  - Will restart on CRRT at this time with target 50-100cc fluid removal per hour.     Hyperk

## 2018-06-17 NOTE — OPERATIVE REPORT
ENDOSCOPY OPERATIVE REPORT    Patient Name: Varinder Angel  Medical Record #: [id number]  YOB: 1965  Date of Procedure: 6/16/2018    Preoperative Diagnosis: NJ-not visible on X-ray.    Postoperative Diagnosis: NJ noted to be in small bowel with

## 2018-06-18 NOTE — PROCEDURES
Bedside procedure being performed on patient who has Hepatic Fluid collection and complicated co-morbidities. Patient is intubated and is being sedated. Consent obtained via POA,sister.   Procedure Time-Out performed and patient was prepped per protocol o

## 2018-06-18 NOTE — PLAN OF CARE
Problem: PAIN - ADULT  Goal: Verbalizes/displays adequate comfort level or patient's stated pain goal  INTERVENTIONS:  - Encourage pt to monitor pain and request assistance  - Assess pain using appropriate pain scale  - Administer analgesics based on type suction as ordered  - Evaluate effectiveness of ordered antiemetic medications  - Provide nonpharmacologic comfort measures as appropriate  - Advance diet as tolerated, if ordered  - Obtain nutritional consult as needed  - Evaluate fluid balance   Outcome: initiate nutrition consult as needed  - Instruct patient on self management of diabetes    Outcome: Progressing  Blood sugar checked every 1 hours at the start of the shift then every 2 hours with regular insulin.     Problem: RESPIRATORY - ADULT  Goal: Ach elevated on pillows.     Problem: Safety Risk - Non-Violent Restraints  Goal: Patient will remain free from self-harm  INTERVENTIONS:  - Apply the least restrictive restraint to prevent harm  - Notify patient and family of reasons restraints applied  - Asse

## 2018-06-18 NOTE — PLAN OF CARE
Restraint need continued, new order per dr Carrillo Duong. crrt machine clotted off,  Mo from [facility] notified will be here to change filter, when staffing permits. pt is on levophed, insulin,fentayl, heparin, & precedex drips.  Remains intubed IR, dr Yogesh Hardy

## 2018-06-18 NOTE — PROGRESS NOTES
RESPIRATORY THERAPY MECHANICAL VENTILATION PROGRESS NOTE    Ventilator Weaning:  Patient meets criteria for weaning? no Weaning was attempted no      06/18/18 0752   Readings   Total RR 24   Minute Ventilation (L/min) 12.6 L/min   Expiratory Tidal Volume 5

## 2018-06-18 NOTE — PROCEDURES
[facility]  Procedure Note    Joyce Lott Patient Status:  Inpatient    1965 MRN [id number]   Location [facility] 2W/SW Attending Donovan Ny MD   Hosp Day # 10 PCP No primary care provider on file.      Procedure:

## 2018-06-18 NOTE — PROGRESS NOTES
Critical Care Progress Note     Assessment / Plan:  1. Acute respiratory failure - due to pulm edema/effusion, pancreatitis, sepsis  -continue mechanical ventilation. No weaning at this time  -off propofol now b/c of uptrending trigs.  Now on precedex/fenta

## 2018-06-18 NOTE — PLAN OF CARE
Maintains or returns to baseline bowel function Not Progressing    Mri done this am which indicates pt may need ercp or other intervention as per Dr Mahamed Wilkins. Abdomen remains firm and distended.    Achieves optimal ventilation and oxygenation Not Progressing

## 2018-06-18 NOTE — PROGRESS NOTES
[facility]    Progress Note    Madihaeunice Rothman Patient Status:  Inpatient    1965 MRN [id number]   Location [facility] 2W/SW Attending Owen Guo MD   Hosp Day # 10 PCP No primary care provider on file.        Subjecti placement of a percutaneous pigtail drainage catheter in the left upper quadrant. There has been interval decrease in the quantity of complex fluid marginating the stomach.   3. Advanced necrotizing pancreatitis with progressive increase in extensive peripa interval drainage in this region. There is a developing loculation of fluid measuring 2.5 x 2.1 cm along the anteroinferior margin of the pancreas (series 5 and image  25).  No additional well-defined/drainable collections are apparent within limitations of line tip last seen projecting near the cavoatrial junction. Multiple tubes and lines overlie the patient, limiting evaluation. HEART/VASC: Cardiomediastinal silhouette unchanged. Aorta is tortuous.  Increased central pulmonary vascular markings without over Likely secondary to a combination of USAMA,CK and Acute pancreatitis. - UOP stable  - Renal function worsened off of CRRT  - Continue CRRT at this time target fluid removal of -50cc/hr at this time.     Hyperkalemia:  - Controlled with CVVH    Hypocalcemia:

## 2018-06-18 NOTE — DIETARY NOTE
NUTRITION NOTE UPDATE:  Received consult to initiate EN and advance to goal; Discontinue TPN after this current bag per MD orders.        NUTRITION PRESCRIPTION:  Diet: NPO, Enteral Nutrition rx: Vital AF 1.2 25-75 ml/hr advan

## 2018-06-18 NOTE — PROGRESS NOTES
[address]    Progress Note    Anthony Ax Patient Status:  Inpatient    1965 MRN [id number]   Location [facility] 2W/SW Attending Eliana Solis MD   Hosp Day # 10 PCP No primary care provider resolved    #pancreatic insufficiency / hyperglycemia  -insulin drip  -check a1c    #renal  -Dr. Liliane Bay on consult. CRRT per renal.  Patient with significant anasarca --> improved on 6/18       DVT ppx:   On heparin drip  Code Status/advanced directives:   F 100 mL MBP 1 g Intravenous Q12H   acetaminophen (OFIRMEV) infusion 1,000 mg 1,000 mg Intravenous Q8H PRN   norepinephrine (LEVOPHED) 4 mg/250 ml premix infusion 0.5-30 mcg/min Intravenous Continuous   Normal Saline Flush 0.9 % injection 10 mL 10 mL Guilherme Urbanoas --   33.0  31.9*   RDW  14.4   --   14.9   --   15.4*   --   15.1*  16.1*   WBC  28.7*   --   28.8*   --   35.4*   --   31.3*  33.3*   PLT  221   --   216   --   174   --   158  132*    < > = values in this interval not displayed.        Recent Labs   Lab in extensive peripancreatic fluid. Overall assessment is suboptimal on this noncontrast examination. 4. Slight interval increase in intraperitoneal ascites. 5. Cholelithiasis is apparent.   6. Small to moderate left larger than right pleural effusions wit within limitations of noncontrast MR technique. 4. Cholelithiasis. Mild edema surrounding the gallbladder without luminal dilation, which is most likely reactive in nature. 5. No significant biliary ductal dilation.  However, punctate filling defects are ag catheter. Right IJ central venous catheter tip projects over the right atrium. Endotracheal tube tip projects approximately 4.3 cm from the fatou. Left-sided PICC line tip last seen projecting near the cavoatrial junction.  Multiple tubes and lines overlie Successful ultrasound-guided placement of subcapsular perihepatic drainage catheter. Sample aspirate has been submitted for microbiologic analysis.      Dictated by (CST): Paty Meraz MD on 6/18/2018 at 13:01     Approved by (CST): Georgian Ply, Landrum Merlin

## 2018-06-18 NOTE — PROGRESS NOTES
[facility]    General Surgery Progress Note  Noel Austinesperanza LEAW:[id number]  # 10       Subjective:    Intubated and sedated  Still requiring CRRT  Feeding tube reinserted yesterday   On TPN    Exam:     General: tachypneic and awake and al Gross per 24 hour   Intake             1793 ml   Output           5465.5 ml   Net          -3672.5 ml       Mri Abdomen&mrcp W/3d (AVZ=[id number])    Result Date: 6/17/2018  CONCLUSION:  1.  Limited noncontrast MRCP examination, which is further compounded Results of this examination were discussed with the patient's physician, Dr. Aidan Ambrose, by Dr. Jane Daigle at 0132 on 6/17/2018.    Dictated by (CST): Pierce Kearney MD on 6/17/2018 at 14:18     Approved by (CST): Pierce Kearney MD on 6/17/2018 at 14:43 06/18/2018   CA 8.2 06/18/2018   CA 8.2 06/18/2018   ALB 2.1 06/18/2018   ALB 2.1 06/18/2018   TP 6.5 06/18/2018        Laura Connolly MD [facility]  06/18/18  12:51 PM

## 2018-06-18 NOTE — PROGRESS NOTES
[facility] AND [facility] Infectious Disease  Progress Note    Lenice Lipoma Patient Status:  Inpatient    1965 MRN [id number]   Location [facility] 2W/SW Attending Prasanna Barnard MD   Hosp Day # 10 PCP No primary care provider on file. historically  Recent blood cultures 6/16 negative once again. Radiology:  CT 6/16:    CONCLUSION:   1. The nasojejunal tube terminates in the second portion of the duodenum; advancement can be considered.  The orogastric tube appears to terminate in the needed     5.  Disposition - inpatient.  Continue meropenem and micafungin. Liver drain placed today.  Monitor for any diarrhea.  WBC spike could be due to SMV thrombus as well. Will trend. Will follow closely along with you.  Prognosis guarded.   >35min

## 2018-06-19 NOTE — PROGRESS NOTES
[facility]    General Surgery Progress Note  Nancy Chiang  VKT:[id number]  # 11       Subjective:    Intubated and sedated  Still requiring CRRT  Tube feedings ongoing    Exam:     General: tachypneic and awake and alert  Pulmonary:lungs 86% (!) 87% 94% 95%   Weight:       Height:         Body mass index is 42.96 kg/m².        Intake/Output Summary (Last 24 hours) at 06/19/18 0928  Last data filed at 06/19/18 0700   Gross per 24 hour   Intake          3666.64 ml   Output              841 ml CREATSERUM 5.69 06/19/2018    06/19/2018   MG 2.3 06/19/2018   PHOS 6.1 06/19/2018   BILT 5.0 06/19/2018    06/19/2018   LDH 2,185 06/19/2018    06/19/2018   HARPAL 59 06/19/2018   LIP 73 06/19/2018   INR 1.4 06/19/2018   PTT 79.4 06/19

## 2018-06-19 NOTE — PLAN OF CARE
Safety Risk - Non-Violent Restraints    • Patient will remain free from self-harm Not Progressing        Pt when lifted from sedation is disoriented and high risk of pulling lines/tubes.

## 2018-06-19 NOTE — PROGRESS NOTES
[facility]    Latrice Cristina Patient Status:  Inpatient    1965 MRN [id number]   Location [facility] 2W/SW Attending Del Carrera MD   Hosp Day # 11 PCP No primary care provider insufficiency / hyperglycemia  -insulin drip  -check a1c    #renal  -Dr. Ovidio Murray on consult. CRRT per renal / as able.         DVT ppx: On heparin drip  Code Status/advanced directives:   Full code   Dispo/Anticipated Discharge Date:  Pending above.   Critic sodium chloride 0.9% 100 mL MBP 1 g Intravenous Q12H   acetaminophen (OFIRMEV) infusion 1,000 mg 1,000 mg Intravenous Q8H PRN   norepinephrine (LEVOPHED) 4 mg/250 ml premix infusion 0.5-30 mcg/min Intravenous Continuous   Normal Saline Flush 0.9 % injectio 31.2*   RDW  14.9   --   15.4*   --   15.1*  16.1*  16.9*   WBC  28.8*   --   35.4*   --   31.3*  33.3*  47.5*   PLT  216   --   174   --   158  132*  116*       Recent Labs   Lab  06/16/18   0536   06/17/18   0314  06/17/18   1206  06/18/18   0000  06/18/ evolution of extensive near-diffuse necrotizing pancreatitis. Extensive ill-defined edema involving the retroperitoneum, central mesentery, and extending along both paracolic gutters, which appears progressed since prior exams.  Dominant fluid collection in Portable  (cpt=71045)    Result Date: 6/18/2018  CONCLUSION:  1. Left basilar pleural effusion with compressive atelectasis in left lower lobe. 2. Minimal right basilar atelectasis. 3. No significant change. 4. Lines and tubes remain in place.      Dictated

## 2018-06-19 NOTE — PLAN OF CARE
Diabetes/Glucose Control    • Glucose maintained within prescribed range Not Progressing    Glucose Q 1 hour per protocol.  On insulin gtt for glucose control        GASTROINTESTINAL - ADULT    • Maintains or returns to baseline bowel function Not Progressi ABG and stat CXR. Order carried out and relayed to AM RN. RISK FOR INFECTION - ADULT    • Absence of fever/infection during anticipated neutropenic period Not Progressing      Febrile overnight. PRN tylenol IV give per MD order. On meropenem.

## 2018-06-19 NOTE — PROGRESS NOTES
[facility]    Progress Note    Venus Stallworth Patient Status:  Inpatient    1965 MRN [id number]   Location [facility] 2W/SW Attending Rashmi Harris MD   Hosp Day # 11 PCP No primary care provider on file.        Subjecti cm right lateral hepatic lobe ill-defined T2 hyperintense lesion, suspicious for hepatic parenchymal abscess. 3. Continued evolution of extensive near-diffuse necrotizing pancreatitis.  Extensive ill-defined edema involving the retroperitoneum, central mese (CST): Lopez Woodward MD on 6/19/2018 at 8:23     Approved by (CST): Lopez Woodward MD on 6/19/2018 at 8:27          Xr Chest Ap Portable  (cpt=71045)    Result Date: 6/18/2018  CONCLUSION:  1.  Left basilar pleural effusion with compressive atelectasis in

## 2018-06-19 NOTE — PROGRESS NOTES
[facility]  Gastroenterology Progress Note    Salvador Zepeda Patient Status:  Inpatient    1965 MRN [id number]   Location [facility] 2W/SW Attending Bev Jacinto MD   Hosp Day # 10 PCP No primary care provider on file.      Sub superimposed pneumonia. 7. Diffuse anasarca. 8. Nonspecific small and large bowel wall thickening. 9. Hepatomegaly with underlying hepatic steatosis. 10. Reidentification of a gastric lipoma. 11. Redemonstration of left adrenal nodule.   12. Lesser inc suspicion for choledocholithiasis. Consider correlation with ERCP as clinically warranted.  6. Known portal, splenic, and superior mesenteric venous thrombosis is not well assessed on this noncontrast exam. 7. Moderate left greater than right pleural effusi anticoagulated for portal, mesenteric, and splenic vein thrombus.  He remains of abx, intubated, and on vasopressors.     -Receiving NJ feeds (tube replaced 6/16, placed with clip)  -Follow up fluid studies from IR placed drain today, LFT's rising prior to

## 2018-06-19 NOTE — PLAN OF CARE
Patient: Theodore Lipoma  MRN: [id number]  : 1965  Allergies:   Seasonal                OTHER (SEE COMMENTS)    Comment:unknown      IR MD/ APN Pre-Procedure Plan  (Inpatients Only)    Date of IR Procedure: 2018  Procedure to be performed: Temp

## 2018-06-19 NOTE — PROGRESS NOTES
[facility] AND [facility]  Gastroenterology Progress Note    Briana Medina Patient Status:  Inpatient    1965 MRN [id number]   Location [facility] 2W/SW Attending Kristan [place] MD   Hosp Day # 11 PCP No primary care provider on file.      Sub effusion with compressive atelectasis in left lower lobe. 2. Minimal right basilar atelectasis. 3. No significant change. 4. Lines and tubes remain in place.      Dictated by (CST): Brooke Pepper MD on 6/18/2018 at 10:52     Approved by (CST): Marcia Wong bleeding  -PPI, abx, pressors, CRRT, vent, supportive care.  Poor prognosis    Mahesh Bui MD  6/19/2018  5:15 PM

## 2018-06-19 NOTE — PROGRESS NOTES
[facility] AND [facility] Infectious Disease  Progress Note    David Grant Patient Status:  Inpatient    1965 MRN [id number]   Location [facility] 2W/SW Attending Vera Farley MD   Hosp Day # 11 PCP No primary care provider on file. terminate in the distal stomach.     2. Interval placement of a percutaneous pigtail drainage catheter in the left upper quadrant. There has been interval decrease in the quantity of complex fluid marginating the stomach.     3.  Advanced necrotizing pancre spent at patient's bedside today in examination and coordination of today's plan of care.     Patricia Jalloh [facility] Infectious Disease  [phone]    6/19/2018  12:52 PM

## 2018-06-19 NOTE — PROGRESS NOTES
Felicity Cleveland Patient Status:  Inpatient    1965 MRN [id number]   Location [facility] 2W/SW Attending Moisés Maldonado MD   Hosp Day # 11 PCP No primary care provider on file. Critical Care Progress Note      Assessment/Plan:  1. (PROTONIX) IV push  40 mg Intravenous Q24H       Vent Mode: VC/AC  FiO2 (%):  [40 %] 40 %  S RR:  [18] 18  S VT:  [500 mL] 500 mL  PEEP/CPAP (cm H2O):  [5 cm H20] 5 cm H20  MAP (cm H2O):  [10-15] 11    Intake/Output Summary (Last 24 hours) at 06/19/18 6220 the last 168 hours. Recent Labs   Lab  06/12/18   1331  06/14/18   0504  06/17/18   1206   CK  7,170*  2,650*  1,178*       Imaging: I independently visualized all relevant chest imaging in PACS, agree with radiology interpretation except where noted.

## 2018-06-19 NOTE — PLAN OF CARE
Patient/Family Goals    • Patient/Family Long Term Goal Progressing        Safety Risk - Non-Violent Restraints    • Patient will remain free from self-harm Progressing        Restraint need continued, new order on chart, pt is intubated at risk for kodak

## 2018-06-19 NOTE — PROGRESS NOTES
06/19/18 0500 06/19/18 0700 06/19/18 0715   Provider Notification   Reason for Communication Other (comment)  (HD cath sluggish. CRRT Arterial pressure high. ) Critical value; Other (comment)  (Hgb: 5.9. CRRT stopped @ 0400. HD cath tPa) Review case; Othe

## 2018-06-19 NOTE — PLAN OF CARE
- Remains intubated/sedated  - when sedation lifted, pt responds to commands and moves all extremities  - skin remains intact, no pressure wounds; frequent turning  - tube feeds maintained, at goal rate; currently, abdomen is not growing more distended  - oxygenation Not Progressing        RISK FOR INFECTION - ADULT    • Absence of fever/infection during anticipated neutropenic period Not Progressing        Safety Risk - Non-Violent Restraints    • Patient will remain free from self-harm Not Progressing

## 2018-06-20 NOTE — PROGRESS NOTES
[facility]    General Surgery Progress Note  Cam Loachapokakaren GRACE:[id number]  # 12       Subjective:    Intubated and sedated  Requiring CRRT  Tolerating tube feedings  No evidence of bleeding    Exam:     General: tachypneic and awake and a °C)      TempSrc: Temporal      SpO2: 98%  98% 100%   Weight:   (!) 304 lb 6.4 oz (138.1 kg)    Height:         Body mass index is 43.68 kg/m².        Intake/Output Summary (Last 24 hours) at 06/20/18 0917  Last data filed at 06/20/18 0800   Gross per 24 ho

## 2018-06-20 NOTE — PROGRESS NOTES
[facility] [facility]    Progress Note    Adele Puckett Patient Status:  Inpatient    1965 MRN [id number]   Location [facility] 2W/SW Attending Jose Evans MD   Hosp Day # 12 PCP No primary care provider on file.        Subjecti 6/18/2018  CONCLUSION:  1. Successful ultrasound-guided placement of subcapsular perihepatic drainage catheter. Sample aspirate has been submitted for microbiologic analysis.      Dictated by (CST): Mukul Palomares MD on 6/18/2018 at 13:01     Approved by

## 2018-06-20 NOTE — PROGRESS NOTES
[address]    Progress Note    Rubye Hands Patient Status:  Inpatient    1965 MRN [id number]   Location [facility] 2W/SW Attending Norris Olszewski, MD   Hosp Day # 12 PCP No primary care provider insufficiency / hyperglycemia  -insulin drip  -check a1c    #renal  -Dr. Spring Alston on consult. CRRT per renal / as able.         DVT ppx: On heparin drip  Code Status/advanced directives:   Full code   Dispo/Anticipated Discharge Date:  Pending above.   Critic sodium chloride 0.9% 100 mL MBP 1 g Intravenous Q12H   acetaminophen (OFIRMEV) infusion 1,000 mg 1,000 mg Intravenous Q8H PRN   norepinephrine (LEVOPHED) 4 mg/250 ml premix infusion 0.5-30 mcg/min Intravenous Continuous   Normal Saline Flush 0.9 % injectio --   33.0  31.9*  31.2*   --    --   31.7*   RDW  15.4*   --   15.1*  16.1*  16.9*   --    --   16.0*   WBC  35.4*   --   31.3*  33.3*  47.5*   --    --   48.0*   PLT  174   --   158  132*  116*   --    --   99*    < > = values in this interval not display progressive bibasilar atelectasis, but I cannot exclude evolving bibasilar pneumonia. Followup study advised.      Dictated by (CST): Clayton Reddy MD on 6/19/2018 at 8:23     Approved by (CST): Clayton Reddy MD on 6/19/2018 at 8:27          Rosy Siddiqui

## 2018-06-20 NOTE — PROGRESS NOTES
spoke with cath lab RN and plan for stop heparin drip at noon. at 0850 CRRT stopped due to low pressure arterial  Line. reposition done and noted no blood flow in arterial line. returend blood and stopped CRRT.  notified.

## 2018-06-20 NOTE — PROGRESS NOTES
Pt back from IR after the procedure. new drain in RT mid abdomen with drainage bag.noted dark color bile. dressing is dry and intact. spoke with Dialysis nurse to restart CRRT,nurse will come  after 7 pm.restarted tube feeding.heparin dip will resume at 0700

## 2018-06-20 NOTE — PROCEDURES
[facility]  Procedure Note    aNncy  Patient Status:  Inpatient    1965 MRN [id number]   Location [facility] Attending Leandro Maza MD   Hosp Day # 12 PCP No primary care provider on file

## 2018-06-20 NOTE — PROGRESS NOTES
[facility] AND [facility]  Gastroenterology Progress Note    Bartlesville Goes Patient Status:  Inpatient    1965 MRN [id number]   Location [facility] 2W/SW Attending Oma Hoffmann MD   Hosp Day # 12 PCP No primary care provider on file.      Sub for CBD stones. He had previous perigastric drain placed 6/13 and removed 6/17. A right subcapsular liver drain was placed 6/18/18 and is draining minimal serosanguinous output. He is anticoagulated for portal, mesenteric, and splenic vein thrombus.  He rem

## 2018-06-20 NOTE — PROGRESS NOTES
Critical Care Progress Note     Assessment / Plan:  1. Acute respiratory failure - due to pulm edema/effusion, pancreatitis, sepsis  -continue mechanical ventilation. No weaning at this time  -liberalized tidal volume this morning.  Repeat ABG  -cont preced

## 2018-06-20 NOTE — PROGRESS NOTES
[facility] AND [facility] Infectious Disease  Progress Note    Lorenza Escalante Patient Status:  Inpatient    1965 MRN [id number]   Location [facility] 2W/SW Attending Leland Faith MD   Hosp Day # 12 PCP No primary care provider on file. placement of a percutaneous pigtail drainage catheter in the left upper quadrant. There has been interval decrease in the quantity of complex fluid marginating the stomach.     3.  Advanced necrotizing pancreatitis with progressive increase in extensive per once again. Gustavo Askew follow closely along with you.  Prognosis guarded.  >35min spent at patient's bedside today in examination and coordination of today's plan of care.       Patricia Wright [facility] Infectious Disease  [phone]    6/20/2018  12

## 2018-06-20 NOTE — PLAN OF CARE
Diabetes/Glucose Control    • Glucose maintained within prescribed range Progressing        DISCHARGE PLANNING    • Discharge to home or other facility with appropriate resources Progressing        GASTROINTESTINAL - ADULT    • Minimal or absence of nausea monitor.

## 2018-06-21 NOTE — PROGRESS NOTES
[address]    Progress Note    Felipe aP Patient Status:  Inpatient    1965 MRN [id number]   Location [facility] 2W/SW Attending Jaime Trammell MD   Hosp Day # 13 PCP No primary care provider cbc.  montior hgb. .  Transfuse as indicated  -heparin drip for SMV thrombosis. Final duration of AC to be determined after acute issues resolved    #pancreatic insufficiency / hyperglycemia  -insulin drip  -check a1c    #renal  -Dr. Blair Du on consult.   CRRT Sodium (MYCAMINE) 100 mg in sodium chloride 0.9 % 100 mL IVPB 100 mg Intravenous Q24H   triple antibiotic (NEOSPORIN) 3.5-400-5000 ointment  Topical TID   benzoin (SPRAYZOIN) liquid  Topical Q4H PRN   fentaNYL citrate (SUBLIMAZE) 1,000 mcg in sodium chlori Lab  06/17/18   0314  06/18/18   0517  06/19/18   0527  06/19/18   1559  06/19/18   2201  06/20/18   0457  06/20/18   1400  06/21/18   0433   RBC  2.74*  2.51*  2.07*   --    --   2.82*   --   2.45*   HGB  8.0*  7.2*  5.9*  6.0*  6.7*  7.9*  7.7*  7.0* --    --   214*   --    --    BILT  1.2   --   3.2*   --   3.5*   --   5.0*   --    --   4.5*   --    --    TP  6.1   --   5.9   --   6.5   --   6.3   --    --   6.4   --    --     < > = values in this interval not displayed.        No results found for: TS

## 2018-06-21 NOTE — PROGRESS NOTES
[facility]  Gastroenterology Progress Note    Anthony Ax Patient Status:  Inpatient    1965 MRN [id number]   Location [facility] 2W/SW Attending Sergio Hodgson MD   Hosp Day # 15 PCP No primary care provider on file.      Subjectiv abnormality      Assessment/Plan:  Mr Milton Gould is a 48 y/o male that presented 6/8/18 with necrotizing pancreatitis 2/2 gallstones. Patient with complicated hospital course with multiorgan system failure.  Currently intubated and sedated and on broad spectrum

## 2018-06-21 NOTE — CM/SW NOTE
Care Management Update:  Hospital day # 13  Pt discussed in rounds and remains critically ill with severe gallstone necrotizing pancreatitis with complicated course with sepsis and multiorgan failure with JEANNA requiring CRRT who remains intubated since 6/11

## 2018-06-21 NOTE — PROGRESS NOTES
[facility] AND [facility] Infectious Disease Progress Note    Felicity Cleveland Patient Status:  Inpatient    1965 MRN [id number]   Location [facility] 2W/SW Attending Renea Lefort, MD   Hosp Day # 15 PCP No primary care provider on file.      Sub Dexmedetomidine HCl (PRECEDEX) 400 mcg in sodium chloride 0.9 % 100 mL infusion, 0.2-1.5 mcg/kg/hr, Intravenous, Continuous  •  phenylephrine HCl (KRISHNA-SYNEPHRINE) 50 mg in sodium chloride 0.9 % 250 mL infusion, 100-200 mcg/min, Intravenous, Continuous  • Sedated    Labs:    Lab Results  Component Value Date   WBC 42.2 06/21/2018   HGB 7.0 06/21/2018   HCT 22.1 06/21/2018    06/21/2018   CREATSERUM 5.39 06/21/2018    06/21/2018    06/21/2018   K 5.5 06/21/2018    06/21/2018   CO2 2

## 2018-06-21 NOTE — PROGRESS NOTES
Anshuorlando Bryant Patient Status:  Inpatient    1965 MRN [id number]   Location [facility] 2W/SW Attending Esteban Hernández MD   Hosp Day # 13 PCP No primary care provider on file. Critical Care Progress Note      Assessment/Plan:  1. 14  S VT:  [600 mL] 600 mL  PEEP/CPAP (cm H2O):  [5 cm H20] 5 cm H20  MAP (cm H2O):  [13-16] 14    Intake/Output Summary (Last 24 hours) at 06/21/18 0729  Last data filed at 06/21/18 0700   Gross per 24 hour   Intake           2944.1 ml   Output Recent Labs   Lab  06/19/18   0737  06/20/18   0904   ABGPHT  7.33*  7.40   IEUUPF2X  40  38   WJZRS7F  227*  77*   ABGHCO3  20.5*  22.8   SITE  Right Radial  Right Radial   THGB  5.3*   --      No results for input(s): BNP in the last 168 hours.   Rece

## 2018-06-21 NOTE — DIETARY NOTE
ADULT NUTRITION REASSESSMENT     Pt is at high nutrition risk. Pt does not meet malnutrition criteria. RECOMMENDATIONS TO MD:  See nutrition intervention for EN nutrient intake. Madhu Claude       NUTRITION DIAGNOSIS/PROBLEM:  Increased nutrient needs related changes to hemodialyisis will need to trial formula change to Nepro goal rate 60 ml/hr and monitor closely for tolerance with SAP.    - Meals and snacks: NPO    - Enteral Nutrition: Enteral Nutrition rx: Vital AF 1.2 75 ml/hr  provides pt ~1980 kcals, 124 g 14.4 oz)   06/11/18 0405 131 kg (288 lb 12.8 oz)   06/10/18 0300 129 kg (284 lb 6.3 oz)   06/09/18 1830 128.8 kg (283 lb 15.2 oz)   06/08/18 1934 125.3 kg (276 lb 4.8 oz)     Wt Readings from Last 10 Encounters:  06/21/18 : (!) 138 kg (304 lb 3.8 oz) NPO  ESTIMATED NUTRITION NEEDS:  Calories:1480-3921 calories/day (Wojciech state equation for intubated pt or 30-35 kcals/kg IBW)  Protein: 113-150 grams protein/day (1.5-2.0 grams protein per kg Ideal body wt (IBW)) f per CRRT and acute catabolic illness- can

## 2018-06-21 NOTE — PROGRESS NOTES
[facility] [place] [facility]    General Surgery Progress Note  Javier Nielsen  MUT:[id number]  HD# 13       Subjective:    Intubated and sedated  Requiring CRRT  Tolerating tube feedings  No evidence of bleeding    Exam:     General: tachypneic and awake and a Pulse: 91 102 83 83   Resp: 16 21 20 19   Temp:    98.8 °F (37.1 °C)   TempSrc:       SpO2:   100% 100%   Weight:       Height:         Body mass index is 43.65 kg/m².        Intake/Output Summary (Last 24 hours) at 06/21/18 0837  Last data filed at 06/21  06/21/2018   K 5.5 06/21/2018    06/21/2018   CO2 20 06/21/2018    06/21/2018   CREATSERUM 5.39 06/21/2018    06/21/2018   MG 2.4 06/21/2018   PHOS 8.4 06/21/2018   PTT 96.0 06/21/2018   CA 8.2 06/21/2018   ALB 1.8 06/21/2018

## 2018-06-21 NOTE — PROGRESS NOTES
[place] [facility] - [facility]    Progress Note    Adele Puckett Patient Status:  Inpatient    1965 MRN [id number]   Location [facility] 2W/SW Attending Jose Evans MD   Hosp Day # 13 PCP No primary care provider on file.        Subjecti 6/20/2018 at 18:55     Approved by (CST): Minh Borden MD on 6/20/2018 at 19:03                        Jennifer Cue.  Yolanda Mark MD  6/11/2018    Assessment and Plan:     JEANNA:  - UA: bland with only trace ketones  - Urine lytes prerenal.  - Bladder pressure of 1

## 2018-06-21 NOTE — PROGRESS NOTES
06/21/18 1435   Clinical Encounter Type   Visited With Health care provider   Routine Visit Introduction   Continue Visiting Yes   Surgical Visit Pre-op   Crisis Visit Critical care   Patient Spiritual Encounters   Spiritual Assessment Completed No   At

## 2018-06-21 NOTE — PLAN OF CARE
DISCHARGE PLANNING    • Discharge to home or other facility with appropriate resources Not Progressing        GASTROINTESTINAL - ADULT    • Maintains or returns to baseline bowel function Not Progressing        GENITOURINARY - ADULT    • Absence of urinary fentanyl precedex & heparn drips continued. Prn pain meds, emotional support to family, message left for dr Miracle Ch to speak with sister poa  After 2 pm.regarding tracheostomy placement. . Sister did not sign consent.  She wants to talk with dr Miracle Ch reg

## 2018-06-21 NOTE — PROGRESS NOTES
RESPIRATORY THERAPY MECHANICAL VENTILATION PROGRESS NOTE    Ventilator Weaning:  Patient meets criteria for weaning? No plans for weaning today.    Patient's ventilator circuit was changed today due to noticeable loss in tidal volumes around 13:00, now reso

## 2018-06-21 NOTE — PROGRESS NOTES
CRRT filter clotted @ 0058. Unable to return blood as there was no flow in the system. HD line able to draw from both lumens and then flushed with saline & Heparin locked with 1500units in each lumen as per protocol.     [facility] notified @ 0105 for the ne

## 2018-06-22 NOTE — ANESTHESIA POSTPROCEDURE EVALUATION
Patient: Angeline Reece    Procedure Summary     Date:  06/22/18 Room / Location:  [facility] OR 02 / [facility] OR    Anesthesia Start:  2593 Anesthesia Stop:      Procedure:  TRACHEOSTOMY (N/A Neck) Diagnosis:  (necratizing pancreatitis, respiratory failure)

## 2018-06-22 NOTE — PLAN OF CARE
DISCHARGE PLANNING    • Discharge to home or other facility with appropriate resources Not Progressing        GASTROINTESTINAL - ADULT    • Maintains or returns to baseline bowel function Not Progressing        GENITOURINARY - ADULT    • Absence of urinary replacement started, remains on levophed, fentanyl, precedex, insulin drips accuchecks q 2 hrs, dr Yuni Rodriguez ordered to restart heparin.  Turning pt every 2 hrs with lift, emotinoal support to family memebers, reviewed side effects of medications & plan of c

## 2018-06-22 NOTE — PROGRESS NOTES
06/22/18 0850   Vent Information   Interface Invasive   Vent Type AV   Vent ID [id number]   Vent Mode VC/AC   Settings   FiO2 (%) 50 %  (increased 02 to maintain good saturation)   Resp Rate (Set) 14   Vt (Set, mL) 600 mL   Waveform Decelerating ramp   PE

## 2018-06-22 NOTE — PROGRESS NOTES
06/22/18 1455   Clinical Encounter Type   Visited With Family   Routine Visit Follow-up   Continue Visiting Yes   Crisis Visit Critical care   Patient Spiritual Encounters   Spiritual Assessment Completed No   Family Spiritual Encounters   Family Coping

## 2018-06-22 NOTE — PROGRESS NOTES
[facility]    Progress Note    Felipe Pa Patient Status:  Inpatient    1965 MRN [id number]   Location [facility] 2W/SW Attending Willie Posada MD   Hosp Day # 14 PCP No primary care provider on file.        Subjecti Leona Plummer MD on 6/20/2018 at 18:55     Approved by (CST): Leona Plummer MD on 6/20/2018 at 19:03                        Vira Saleem.  Jarrett Morrison MD  6/11/2018    Assessment and Plan:     JEANNA:  - UA: bland with only trace ketones  - Urine lytes prerenal.

## 2018-06-22 NOTE — PROGRESS NOTES
Critical Care Progress Note     Assessment / Plan:  1. Acute respiratory failure - due to pulm edema/effusion, pancreatitis, sepsis  -continue mechanical ventilation.  No weaning at this time  -liberalized tidal volume  -trach today  -cont precedex/fentanyl

## 2018-06-22 NOTE — ANESTHESIA PREPROCEDURE EVALUATION
Anesthesia PreOp Note    HPI:     Mini Aguirre is a 47year old male who presents for preoperative consultation requested by: Bolivar Dodd MD    Date of Surgery: 6/8/2018 - 6/22/2018    Procedure(s):  TRACHEOSTOMY  Indication: necratizing pancreati % 250 mL infusion 0.5-30 mcg/min Intravenous Continuous Sarabjit Saleh MD Last Rate: 9.4 mL/hr at 06/22/18 0618 5 mcg/min at 06/22/18 0618   Normal Saline Flush 0.9 % injection 10 mL 10 mL Intravenous PRN Brenna Abebe MD 10 mL at 06/21/18 212 Tessa Eisenmenger, MD Stopped at 06/15/18 1400    Dexmedetomidine HCl (PRECEDEX) 400 mcg in sodium chloride 0.9 % 100 mL infusion 0.2-1.5 mcg/kg/hr Intravenous Continuous Sera Mosher MD Last Rate: 36 mL/hr at 06/22/18 0628 1.1 mcg/kg/hr at 06/22/18 06 Available pre-op labs reviewed.     Lab Results  Component Value Date   WBC 45.0 (H) 06/22/2018   RBC 2.25 (L) 06/22/2018   HGB 6.4 (LL) 06/22/2018   HCT 20.3 (L) 06/22/2018   MCV 90.6 06/22/2018   MCH 28.5 06/22/2018   MCHC 31.5 (L) 06/22/2018   RDW All of the patient's questions were answered to the best of my ability. The patient desires the anesthetic management as planned.   Vicki Tapia  6/22/2018 7:06 AM

## 2018-06-22 NOTE — OPERATIVE REPORT
OPERATIVE REPORT:     PATIENT NAME: Cam Chou  : 1965   CSN: [id number]    DATE OF OPERATION:   18    PREOPERATIVE DIAGNOSIS: Respiratory failure     POSTOPERATIVE DIAGNOSIS: Same     PROCEDURE PERFORMED: Tracheostomy     SURGEON:  ZEINAB FIGUEROA

## 2018-06-22 NOTE — PROGRESS NOTES
[facility] AND [facility] Infectious Disease  Progress Note    Lenice Lipoma Patient Status:  Inpatient    1965 MRN [id number]   Location [facility] 2W/SW Attending Kulwinder Soriano MD   Hosp Day # 14 PCP No primary care provider on file.      Norris in the second portion of the duodenum; advancement can be considered. The orogastric tube appears to terminate in the distal stomach.     2. Interval placement of a percutaneous pigtail drainage catheter in the left upper quadrant.  There has been interval may need further imaging once again.  Will follow closely along with you.  Prognosis guarded.  >35min spent at patient's bedside today in examination and coordination of today's plan of care. Particia Alfaro  [address] [place] [address] Infectious Disease  [phone]

## 2018-06-22 NOTE — PROGRESS NOTES
[address]    Progress Note    Palmer Gravely Patient Status:  Inpatient    1965 MRN [id number]   Location [facility] 2W/SW Attending Radha Pérez MD   Hosp Day # 14 PCP No primary care provider started 6/17. Continue IV meropenem. Monitor for diarrhea    #heme  -daily cbc.  montior hgb. .  Transfuse as indicated  -heparin drip for SMV thrombosis- held as noted above w/ post op oozing, s/p Vit K 6/21/18 10mg  - Final duration of AC to be determin ringers infusion  Intravenous Continuous   fentaNYL citrate (SUBLIMAZE) 0.05 MG/ML injection 25 mcg 25 mcg Intravenous Q5 Min PRN   fentaNYL citrate (SUBLIMAZE) 0.05 MG/ML injection 50 mcg 50 mcg Intravenous Q5 Min PRN   HYDROmorphone HCl (DILAUDID) 1 MG/M infusion 1,000 mg 1,000 mg Intravenous Q8H PRN   [MAR Hold] Normal Saline Flush 0.9 % injection 10 mL 10 mL Intravenous PRN   [MAR Hold] Chlorhexidine Gluconate (PERIDEX) 0.12 % solution 15 mL 15 mL Mouth/Throat Desire@[employer]   propofol (DIPRIVAN) infusion --   99*   --   101*  79*       Recent Labs   Lab  06/17/18   0314   06/18/18   0517   06/19/18   0527   06/20/18   0457  06/20/18   1400  06/21/18   0433  06/21/18   1448  06/22/18   0415   GLU  192*   < >  163*  163*   < >  191*   < >  154*  150*  174 at 1715 hrs.       Dictated by (CST): Kim Peck MD on 6/20/2018 at 18:55     Approved by (CST): Kim Peck MD on 6/20/2018 at 19:03

## 2018-06-22 NOTE — PLAN OF CARE
Problem: PAIN - ADULT  Goal: Verbalizes/displays adequate comfort level or patient's stated pain goal  INTERVENTIONS:  - Encourage pt to monitor pain and request assistance  - Assess pain using appropriate pain scale  - Administer analgesics based on type Maintain adequate hydration with IV or PO as ordered and tolerated  - Evaluate effectiveness of GI medications  - Encourage mobilization and activity  - Obtain nutritional consult as needed  - Establish a toileting routine/schedule  - Consider collaboratin oxygen saturation or ABGs  - Provide Smoking Cessation handout, if applicable  - Encourage broncho-pulmonary hygiene including cough, deep breathe, Incentive Spirometry  - Assess the need for suctioning and perform as needed  - Assess and instruct to repor restraints applied  - Assess for any contributing factors to confusion (electrolyte disturbances, delirium, medications)  - Discontinue any unnecessary medical devices as soon as possible  - Assess the patient's physical comfort, circulation, skin conditio

## 2018-06-22 NOTE — CM/SW NOTE
Pt sent for trach placement today. Pt remains on CRRT and is not yet stable for discharge. Pt is likely to need LTAC once stable. SW confirmed with [address] [place] [address] [employer] that the pt's insurance has a max coverage at 31 days.  Due to limited coverage, a Medicaid artur

## 2018-06-23 NOTE — PROGRESS NOTES
at bed side. order for send hemoglobin level. removed dressing from tracheostomy no active bleeding noted. called MD with result and order received to start heparin drip at low dose and resume DVT/PE heparin protocol later on with pt status. order car

## 2018-06-23 NOTE — PROGRESS NOTES
[address]    Progress Note    Marita Davis Patient Status:  Inpatient    1965 MRN [id number]   Location [facility] 2W/SW Attending Criss Hines MD   Hosp Day # 15 PCP No primary care provider having loose stool after TPN-  Cdiff neg- started on ppx PO vanc per GI        #heme  -daily cbc.  montior hgb. .  Transfuse as indicated- given 1 u 6/22 w/ stable Hg since oozing improved  -heparin drip for SMV thrombosis- held w/ post op oozing and given Glucose-Vitamin C (DEX-4) 4-0.006 g chewable tab 4 tablet 4 tablet Oral Q15 Min PRN   Or      dextrose 50 % injection 50 mL 50 mL Intravenous Q15 Min PRN   Or      glucose (DEX4) oral liquid 30 g 30 g Oral Q15 Min PRN   Or      Glucose-Vitamin C (DEX-4) chloride 0.9% 100 mL MBP 1 g Intravenous Q12H   acetaminophen (OFIRMEV) infusion 1,000 mg 1,000 mg Intravenous Q8H PRN   Normal Saline Flush 0.9 % injection 10 mL 10 mL Intravenous PRN   Chlorhexidine Gluconate (PERIDEX) 0.12 % solution 15 mL 15 mL Mouth/T 06/19/18   0527   06/20/18   0457   06/21/18   0433  06/21/18   1448  06/22/18   0415  06/22/18   1156  06/23/18   0210   GLU  192*   < >  163*  163*   < >  191*   < >  154*   < >  174*  244*  164*  169*  180*   BUN  106*   < >  96*  96*   < >  117*   < >

## 2018-06-23 NOTE — PROGRESS NOTES
RESPIRATORY THERAPY MECHANICAL VENTILATION PROGRESS NOTE    Ventilator Weaning:  Patient meets criteria for weaning? no Weaning was attempted no    Current Ventilator Data:     06/23/18 0820   Readings   Total RR 23   Minute Ventilation (L/min) 14.2 L/min

## 2018-06-23 NOTE — PROGRESS NOTES
[facility] - [facility]    Progress Note    Robylaurita Woodall Patient Status:  Inpatient    1965 MRN [id number]   Location [facility] 2W/SW Attending Maya Martin MD   Hosp Day # 15 PCP No primary care provider on file.        SUBJECTIVE: placement of percutaneous cholecystostomy tube. Cholecystostomy tube is remain attached a bag for gravity drainage. 3. Cholecystogram confirms numerous gallstones and cystic duct occlusion. 4. Findings shared with Dr. Kip Pinzon on 6/20/18 at [id number] hrs. Continuous   Normal Saline Flush 0.9 % injection 10 mL 10 mL Intravenous PRN   dextrose 10 % infusion  Intravenous Continuous PRN   LORazepam (ATIVAN) injection 1 mg 1 mg Intravenous Q4H PRN   Micafungin Sodium (MYCAMINE) 100 mg in sodium chloride 0.9 % 10 complication status, unspecified pancreatitis type  Active Problems:    Hypokalemia    Hyperglycemia    Acute pancreatitis    Liver function abnormality        Plan:     The patient with acute kidney failure, oliguric, hypotensive on the pressors.     In po

## 2018-06-23 NOTE — PROGRESS NOTES
[facility]  Gastroenterology Progress Note    Maria Del Carmen Beyer Patient Status:  Inpatient    1965 MRN [id number]   Location [facility] 2W/SW Attending Nanda Reddy MD   Hosp Day # 15 PCP No primary care provider on file.      Subjectiv

## 2018-06-23 NOTE — PROGRESS NOTES
[facility] AND [facility] Infectious Disease Progress Note    Vlad Jensen Patient Status:  Inpatient    1965 MRN [id number]   Location [facility] 2W/SW Attending Caren Casanova MD   Hosp Day # 15 PCP No primary care provider on file.      Sub 250 mL infusion, 0.5-30 mcg/min, Intravenous, Continuous  •  Normal Saline Flush 0.9 % injection 10 mL, 10 mL, Intravenous, PRN  •  dextrose 10 % infusion, , Intravenous, Continuous PRN  •  LORazepam (ATIVAN) injection 1 mg, 1 mg, Intravenous, Q4H PRN  • Ordered in Other Encounters:   •  propofol (DIPRIVAN) injection, , Intravenous, PRN    Physical Exam:  General: Alert, orientated x3. Cooperative. No apparent distress.   Vital Signs:  Blood pressure 110/63, pulse 101, temperature 97.6 °F (36.4 °C), tempe questions or concerns please call [facility] at [phone].      LAY DUNNE NP  6/21/2018  9:55 AM

## 2018-06-23 NOTE — PLAN OF CARE
Problem: PAIN - ADULT  Goal: Verbalizes/displays adequate comfort level or patient's stated pain goal  INTERVENTIONS:  - Encourage pt to monitor pain and request assistance  - Assess pain using appropriate pain scale  - Administer analgesics based on type routine/schedule  - Consider collaborating with pharmacy to review patient's medication profile   Outcome: Progressing    Goal: Maintains adequate nutritional intake (undernourished)  INTERVENTIONS:  - Monitor percentage of each meal consumed  - Identify f as needed  - Assess and instruct to report SOB or any respiratory difficulty  - Respiratory Therapy support as indicated  - Manage/alleviate anxiety  - Monitor for signs/symptoms of CO2 retention   Outcome: Not Progressing      Problem: METABOLIC/FLUID AND and minimize risk of hemorrhage  - Monitor temperature, glucose, and sodium.  Initiate appropriate interventions as ordered   Outcome: Not Progressing      Problem: Safety Risk - Non-Violent Restraints  Goal: Patient will remain free from self-harm  INTERVE law at bedside in evening, sister updated via phone.

## 2018-06-23 NOTE — PROGRESS NOTES
[facility] - [facility]    General Surgery Progress Note  Mala Ritchie  LST:[id number]  HD# 15       Subjective:   On ventilator, trach in place and sedated  Requiring CRRT  Tolerating tube feedings  Pt bleed post op lastnight, no active bleeding today TempSrc:    Temporal   SpO2: 100% 100% 100% 100%   Weight:       Height:         Body mass index is 43.65 kg/m².        Intake/Output Summary (Last 24 hours) at 06/23/18 1232  Last data filed at 06/23/18 1200   Gross per 24 hour   Intake           6173.8

## 2018-06-23 NOTE — PROGRESS NOTES
Critical Care Progress Note     Assessment / Plan:  1. Acute respiratory failure - due to pulm edema/effusion, pancreatitis, sepsis  -continue mechanical ventilation.  No weaning at this time  -liberalized tidal volume  -s/p trach 6/22  -cont precedex/fenta

## 2018-06-24 NOTE — PROGRESS NOTES
[place] [facility] - [facility]    Progress Note    Raman Hands Patient Status:  Inpatient    1965 MRN [id number]   Location [facility] 2W/SW Attending Ryder Zurita MD   Hosp Day # 12 PCP No primary care provider on file.        SUBJECTIVE: Vancomycin HCl (FIRVANQ) 50 MG/ML oral solution 125 mg 125 mg Per G Tube Daily   NxStage Pure Flow 400 CRRT fluid 5000mL 3 L/hr CRRT Continuous   heparin (PORCINE) drip 55009ramvm/250mL infusion CONTINUOUS 200-3,000 Units/hr Intravenous Continuous   Norm Topical TID   benzoin (SPRAYZOIN) liquid  Topical Q4H PRN   fentaNYL citrate (SUBLIMAZE) 1,000 mcg in sodium chloride 0.9 % 100 mL infusion 25 mcg/hr Intravenous Continuous   Normal Saline Flush 0.9 % injection 10 mL 10 mL Intravenous PRN   dextrose 10 % i will support blood pressure with albumin and pressors if needed, will reevaluate in a.m. and plan hemodialysis as well if the patient is a stable.               Dwayne De Paz MD  6/24/2018  11:39 AM

## 2018-06-24 NOTE — PROGRESS NOTES
[facility]    General Surgery Progress Note  Latrice Cristina  LWW:[id number]  # 16       Subjective:   On ventilator, trach in place and sedated  Requiring CRRT  Tolerating tube feedings  Pt bleed post op , no active bleeding today at trach 06/24/18  0300 06/24/18  0400 06/24/18  0500 06/24/18  0600   BP: (!) 119/91 145/69 140/70 144/71   Pulse: 120 114 111 110   Resp: (!) 33 (!) 28 25 25   Temp:  99.2 °F (37.3 °C)     TempSrc:  Temporal     SpO2: 100% 100% 100% 100%   Weight:   (!) 322 lb 5

## 2018-06-24 NOTE — PROGRESS NOTES
[address]    Progress Note    Rubye Hands Patient Status:  Inpatient    1965 MRN [id number]   Location [facility] 2W/SW Attending Norris Olszewski, MD   Hosp Day # 16 PCP No primary care provider having loose stool after TPN-  Cdiff neg- started on ppx PO vanc per GI        #heme  -daily cbc.  montior hgb. .  Transfuse as indicated- given 1 u 6/22 w/ stable Hg since oozing improved  -heparin drip for SMV thrombosis- held w/ post op oozing and given drip 55204ggqsg/250mL infusion CONTINUOUS 200-3,000 Units/hr Intravenous Continuous   Normal Saline Flush 0.9 % injection 10 mL 10 mL Intravenous PRN   glucose (DEX4) oral liquid 15 g 15 g Oral Q15 Min PRN   Or      Glucose-Vitamin C (DEX-4) 4-0.006 g chew Continuous   Normal Saline Flush 0.9 % injection 10 mL 10 mL Intravenous PRN   dextrose 10 % infusion  Intravenous Continuous PRN   dextrose 10 % infusion  Intravenous Continuous PRN   acetaminophen (OFIRMEV) infusion 1,000 mg 1,000 mg Intravenous Q8H PRN 79*   --   57*  89*   --    --   45*       Recent Labs   Lab  06/18/18   0517   06/19/18   0527   06/20/18   0457   06/21/18   1448  06/22/18   0415  06/22/18   1156  06/23/18   0210  06/24/18   0215   GLU  163*  163*   < >  191*   < >  154*   < >  244*  1

## 2018-06-24 NOTE — PLAN OF CARE
Diabetes/Glucose Control    • Glucose maintained within prescribed range Progressing        DISCHARGE PLANNING    • Discharge to home or other facility with appropriate resources Progressing        GASTROINTESTINAL - ADULT    • Minimal or absence of nausea HD on tomorrow. pt is incontinent of stool. stool sample sent for c-diff and result came negative. vitals in limit.will continue to monitor.

## 2018-06-24 NOTE — PROGRESS NOTES
RESPIRATORY THERAPY MECHANICAL VENTILATION PROGRESS NOTE    Ventilator Weaning:  Patient meets criteria for weaning? no Weaning was attempted no   Current Ventilator Data:             06/24/18 1100   Readings   ETCO2 (mmHg) 32 mmHg   Total RR 24   Minute V

## 2018-06-24 NOTE — PLAN OF CARE
Problem: PAIN - ADULT  Goal: Verbalizes/displays adequate comfort level or patient's stated pain goal  INTERVENTIONS:  - Encourage pt to monitor pain and request assistance  - Assess pain using appropriate pain scale  - Administer analgesics based on type routine/schedule  - Consider collaborating with pharmacy to review patient's medication profile   Outcome: Progressing    Goal: Maintains adequate nutritional intake (undernourished)  INTERVENTIONS:  - Monitor percentage of each meal consumed  - Identify f volume excess or deficit  - Monitor intake, output and patient weight  - Monitor urine specific gravity, serum osmolarity and serum sodium as indicated or ordered  - Monitor response to interventions for patient's volume status, including labs, urine outpu factors to confusion (electrolyte disturbances, delirium, medications)  - Discontinue any unnecessary medical devices as soon as possible  - Assess the patient's physical comfort, circulation, skin condition, hydration, nutrition and elimination needs   -

## 2018-06-24 NOTE — PROGRESS NOTES
[facility]  Gastroenterology Progress Note    [place] Patient Patient Status:  Inpatient    1965 MRN [id number]   Location [facility] 2W/SW Attending Peter Baron MD   Hosp Day # 12 PCP No primary care provider on file.      Subjectiv thrombus  -Trending CBC, transfusing as needed no overt signs of bleeding, likely hemolysis, hapto is low  -Continue enteral feeds via NJ tube, placed with clip attached (if removed needs to be done endoscopically)  -c/w abx, supportive care ID, renal, pul

## 2018-06-24 NOTE — PROGRESS NOTES
Jason Rasmussen is a 47year old male. Patient presents with:  Abdomen/Flank Pain (GI/)      HPI:    Sedated shock on dialysis    REVIEW OF SYSTEMS:   A comprehensive 11 point review of systems was completed.   Pertinent positives and negatives noted in on consult   4. JEANNA  -on CRRT  5. Loose BM  -check for c diff if more frequent or becomes watery  6.  Pressor dependent and guarded prognosis     If you have any questions or concerns please call [facility] at [phone].                Lab Results  Compone -American >60 >=60   -LIPASE   Result Value Ref Range   Lipase 8,316 (H) 22 - 51 U/L   -URINALYSIS WITH CULTURE REFLEX   Result Value Ref Range   Urine Color Yellow Yellow   Clarity Urine Clear Clear   Spec Gravity 1.030 1.002 - 1.035   pH Urine 6. 0 mmol/L   Potassium 4.8 3.3 - 5.1 mmol/L   Chloride 101 95 - 110 mmol/L   CO2 27 22 - 32 mmol/L   BUN 19 8 - 20 mg/dL   Creatinine 1.65 (H) 0.50 - 1.50 mg/dL   Calcium, Total 8.1 (L) 8.5 - 10.5 mg/dL   BUN/CREA Ratio 11.5 10.0 - 20.0   Anion Gap 8 0 - 18 mm 0.50 - 1.50 mg/dL   Calcium, Total 6.9 (L) 8.5 - 10.5 mg/dL   BUN/CREA Ratio 10.4 10.0 - 20.0   Anion Gap 12 0 - 18 mmol/L   Calculated Osmolality 298 (H) 275 - 295 mOsm/kg   GFR, Non-African American 20 (L) >=60   GFR, -American 23 (L) >=60   -HEMO g/dL   Globulin 2.9 2.5 - 3.7 g/dL   A/G Ratio 1.0 1.0 - 2.0   Anion Gap 12 0 - 18 mmol/L   BUN/CREA Ratio 10.4 10.0 - 20.0   Calculated Osmolality 306 (H) 275 - 295 mOsm/kg   GFR, Non-African American 13 (L) >=60   GFR, -American 15 (L) >=60   -STANLEY METABOLIC PANEL (14)   Result Value Ref Range   Glucose 171 (H) 70 - 99 mg/dL   Sodium 137 136 - 144 mmol/L   Potassium 4.7 3.3 - 5.1 mmol/L   Chloride 103 95 - 110 mmol/L   CO2 21 (L) 22 - 32 mmol/L   BUN 76 (H) 8 - 20 mg/dL   Creatinine 8.41 (H) 0.50 - 1 inflammatory disorders (collagen vascular/autoimmune), acute hemorrhage/hemolysis, and metabolic disorders (uremia, ketoacidosis, others). Clinical correlation is recommended.     Reviewed by Abdiaziz Rao M.D.        -LACTIC ACID, PLASMA   Result Value Re Device CPAP/BIPAP    FIO2 40.00    Inspiratory Pressure 12 cm H2O   Expiratory Pressure 5 cm H2O   Modified Allens Test Positive    Puncture Charge Yes    Blood Gas Analyzer ABLA    -LACTIC ACID 3 HR POST POSITIVE   Result Value Ref Range   Lactic Acid 2. 0 - 99 mg/dL   Sodium 139 136 - 144 mmol/L   Potassium 5.0 3.3 - 5.1 mmol/L   Chloride 108 95 - 110 mmol/L   CO2 21 (L) 22 - 32 mmol/L   BUN 86 (H) 8 - 20 mg/dL   Creatinine 9.10 (H) 0.50 - 1.50 mg/dL   Calcium, Total 6.3 (L) 8.5 - 10.5 mg/dL   BUN/CREA Rati - 23.0 Vol%   Blood Gas P-50 30 (H) 24 - 28 mmHg   Oxygen Delivery Device Vent    Blood Gas Vent Mode A/C    Blood Gas Respiration Rate 18 BPM   Tidal Volume 500.0 mL   FIO2 50.00    PEEP 5.0 cm H2O   Modified Allens Test Positive    Puncture Charge Yes Total Hemoglobin 10.3 (L) 13.5 - 17.5 g/dL   ABG O2 Saturation >98.5 94.0 - 100.0 %   Carboxyhemoglobin 1.7 (H) 0.0 - 1.5 %   Methemoglobin 2.0 (H) 0.4 - 1.5 % SAT   Oxygen Content 14.3 (L) 15.0 - 23.0 Vol%   Blood Gas P-50 28 24 - 28 mmHg   Oxygen Deliv ACID, PLASMA   Result Value Ref Range   Lactic Acid 2.0 0.5 - 2.2 mmol/L   -PTT, ACTIVATED   Result Value Ref Range   PTT 77.0 (H) 23.2 - 35.3 seconds   -CALCIUM, IONIZED, SERUM   Result Value Ref Range   Sample Type Venous    Ionized Calcium 1.01 0.95 - 1 - 4.8 g/dL   Phosphorus 5.9 (H) 2.4 - 4.7 mg/dL   BUN/CREA Ratio 20.8 (H) 10.0 - 20.0   Anion Gap 10 0 - 18 mmol/L   Calculated Osmolality 321 (H) 275 - 295 mOsm/kg   GFR, Non-African American 15 (L) >=60   GFR, -American 17 (L) >=60   -LIPASE   Res American 17 (L) >=60   GFR, -American 19 (L) >=60   -RENAL FUNCTION PANEL   Result Value Ref Range   Glucose 155 (H) 70 - 99 mg/dL   Sodium 140 136 - 144 mmol/L   Potassium 4.7 3.3 - 5.1 mmol/L   Chloride 104 95 - 110 mmol/L   CO2 25 22 - 32 mmol/L BUN 88 (H) 8 - 20 mg/dL   Creatinine 2.88 (H) 0.50 - 1.50 mg/dL   Calcium, Total 7.9 (L) 8.5 - 10.5 mg/dL   Albumin 2.0 (L) 3.5 - 4.8 g/dL   Phosphorus 3.5 2.4 - 4.7 mg/dL   BUN/CREA Ratio 30.6 (H) 10.0 - 20.0   Anion Gap 9 0 - 18 mmol/L   Calculated Osm -American 28 (L) >=60   -HEMOGLOBIN   Result Value Ref Range   HGB 7.8 (L) 13.5 - 17.5 g/dL   -RENAL FUNCTION PANEL   Result Value Ref Range   Glucose 181 (H) 70 - 99 mg/dL   Sodium 144 136 - 144 mmol/L   Potassium 4.9 3.3 - 5.1 mmol/L   Chloride 11 10.0 - 20.0   Calculated Osmolality 331 (H) 275 - 295 mOsm/kg   GFR, Non-African American 22 (L) >=60   GFR, -American 25 (L) >=60   -PHOSPHORUS   Result Value Ref Range   Phosphorus 2.4 2.4 - 4.7 mg/dL   -PTT, ACTIVATED   Result Value Ref Range   P mg/dL   -PTT, ACTIVATED   Result Value Ref Range   PTT 77.0 (H) 23.2 - 35.3 seconds   -PTT, ACTIVATED   Result Value Ref Range   PTT 79.4 (H) 23.2 - 35.3 seconds   -RENAL FUNCTION PANEL   Result Value Ref Range   Glucose 191 (H) 70 - 99 mg/dL   Sodium 138 Delivery Device Vent    Blood Gas Vent Mode A/C    Blood Gas Respiration Rate 18 BPM   Tidal Volume 500.0 mL   FIO2 100.00    PEEP 5.0 cm H2O   Modified Allens Test Positive    Puncture Charge Yes    Blood Gas Analyzer ABLA    -MD BLOOD SMEAR CONSULT   Res 22 - 32 mmol/L    (H) 8 - 20 mg/dL   Creatinine 6.07 (H) 0.50 - 1.50 mg/dL   Calcium, Total 7.6 (L) 8.5 - 10.5 mg/dL   BUN/CREA Ratio 20.6 (H) 10.0 - 20.0   Anion Gap 11 0 - 18 mmol/L   Calculated Osmolality 331 (H) 275 - 295 mOsm/kg   GFR, Non-Afri -2.0 - 2.0 mmol/L   ABG O2 Saturation 95.8 94.0 - 100.0 %   Oxygen Content 10.4 (L) 15.0 - 23.0 Vol%   Blood Gas P-50 25 24 - 28 mmHg   Oxygen Delivery Device Vent    Blood Gas Vent Mode A/C    Blood Gas Respiration Rate 14 BPM   Tidal Volume 600.0 mL   FI Glucose 244 (H) 70 - 99 mg/dL   Sodium 136 136 - 144 mmol/L   Potassium 4.9 3.3 - 5.1 mmol/L   Chloride 104 95 - 110 mmol/L   CO2 22 22 - 32 mmol/L   BUN 98 (H) 8 - 20 mg/dL   Creatinine 4.31 (H) 0.50 - 1.50 mg/dL   Calcium, Total 7.7 (L) 8.5 - 10.5 mg/d 4.7 mg/dL   BUN/CREA Ratio 21.5 (H) 10.0 - 20.0   Anion Gap 12 0 - 18 mmol/L   Calculated Osmolality 318 (H) 275 - 295 mOsm/kg   GFR, Non-African American 12 (L) >=60   GFR, -American 14 (L) >=60   -HEMOGLOBIN + HEMATOCRIT   Result Value Ref Range 100 U/L   Bilirubin, Total 3.2 (H) 0.3 - 1.2 mg/dL   Total Protein 6.1 5.9 - 8.4 g/dL   Albumin 1.9 (L) 3.5 - 4.8 g/dL   Globulin 4.2 (H) 2.5 - 3.7 g/dL   A/G Ratio 0.5 (L) 1.0 - 2.0   Anion Gap 10 0 - 18 mmol/L   BUN/CREA Ratio 19.8 10.0 - 20.0   Calculat

## 2018-06-25 NOTE — PLAN OF CARE
GASTROINTESTINAL - ADULT    • Minimal or absence of nausea and vomiting Completed        NO NAUSEA/VOMITING AT THIS TIME. PATIENT ON VENT AND SEDATION, DOES NOT FOLLOW COMMANDS.  UNABLE TO   Diabetes/Glucose Control    • Glucose maintained within prescribed

## 2018-06-25 NOTE — PROGRESS NOTES
[facility]  Gastroenterology Progress Note    [place] Patient Patient Status:  Inpatient    1965 MRN [id number]   Location [facility] 2W/SW Attending Peter Baron MD   Hosp Day # 16 PCP No primary care provider on file.      Subjectiv CT 6/16, repeat CT planned for tomorrow. Gisella Roy MD [facility]  6/25/2018  4:16 PM

## 2018-06-25 NOTE — PROGRESS NOTES
[facility] Pharmacy Note:  Renal Dose Adjustment (CRRT)    Marita Davis has been prescribed meropenem (MERREM) every 24 hours. Pharmacy has been asked to review medications for appropriateness for CRRT. Renal Replacement fluid rate is 4 L/hr.      Pharmacy w

## 2018-06-25 NOTE — PROGRESS NOTES
[address]    Progress Note    Noel Watts Patient Status:  Inpatient    1965 MRN [id number]   Location [facility] 2W/SW Attending Elder Bloom MD   Hosp Day # 16 PCP No primary care provider having loose stool after TPN-  Cdiff neg- started on ppx PO vanc per GI    #heme  - Hg dropping s/p 1 u 6/22 w/ oozing, 1 U earlier today and now  - ?  DIC picture  - heparin for SMV clot held d/t bloody stools  - HIT ab neg, suspec thrombocytpenia fm sepsi 10 mL 10 mL Intravenous PRN   Normal Saline Flush 0.9 % injection 10 mL 10 mL Intravenous PRN   Dexmedetomidine HCl (PRECEDEX) 1,000 mcg in sodium chloride 0.9 % 250 mL infusion (non-standard concentration)  Intravenous Continuous   Normal Saline Flush 0.9 108 (90 Base) MCG/ACT inhaler 1 puff 1 puff Inhalation Q4H PRN     Facility-Administered Medications Ordered in Other Encounters:  propofol (DIPRIVAN) injection  Intravenous PRN       Lab Data:  Reviewed in [address] [place] [address]   Lab  06/22/18   0415   06/22/ 5.0*   --   4.5*   --   3.2*   --    --   3.2*  3.7*   TP  6.3   --   6.4   --   6.3   --    --   6.1  6.1    < > = values in this interval not displayed. No results found for: TSH, LIPASE  No results for input(s): TROP, CK in the last 168 hours.

## 2018-06-25 NOTE — PLAN OF CARE
Diabetes/Glucose Control    • Glucose maintained within prescribed range Not Progressing      accu-check Q 1 hour. On insulin gtt for glucose control      HEMATOLOGIC - ADULT    • Free from bleeding injury Not Progressing      Hgb: 6.3.  Dr. Jason Mcgarry - CCU Frequent rounding done. Side rail x 3 up for safety. Will  Continue to monitor.

## 2018-06-25 NOTE — PROGRESS NOTES
NEPHROLOGY DAILY PROGRESS NOTE       [place] Patient Patient Status:  Inpatient    1965 MRN [id number]   Location [facility] 2W/SW Attending Peter Baron MD   Hosp Day # 16 PCP No primary care provider on file. Follow up Reason:  JEANNA Glucose-Vitamin C (DEX-4) 4-0.006 g chewable tab 8 tablet 8 tablet Oral Q15 Min PRN   lactated ringers infusion  Intravenous Continuous   fentaNYL citrate (SUBLIMAZE) 0.05 MG/ML injection 25 mcg 25 mcg Intravenous Q5 Min PRN   fentaNYL citrate (SUBLIMAZE 5-100 mcg/kg/min Intravenous Continuous   phenylephrine HCl (KRISHNA-SYNEPHRINE) 50 mg in sodium chloride 0.9 % 250 mL infusion 100-200 mcg/min Intravenous Continuous   Insulin Regular Human (NOVOLIN R) 100 Units in sodium chloride 0.9 % 100 mL infusion 1-28 U - will continues to assess daily for CRRT needs   -  Follow I/Os   - Access: RIJ tunneled HD catheter  - as patient febrile overnight, will draw cultures from perm cath     Hyperkalemia:  - Elevated this AM, should improve with restart of CRRT  - will [facility]

## 2018-06-25 NOTE — PROGRESS NOTES
Critical Care Progress Note     Assessment / Plan:  1. Acute respiratory failure - due to pulm edema/effusion, pancreatitis, sepsis  -s/p trach 6/22  -PS trial today  -minimize sedation.  No propofol due to uptrending trigs  -limit further IVFs, volume jodie

## 2018-06-25 NOTE — PROGRESS NOTES
[facility]    General Surgery Progress Note  Joyce Lawmarcia  JNW:[id number]  HD# 17       Subjective:   On ventilator, trach in place and sedated  Required HD yest and will get CRRT today  Tolerating tube feedings and having BMs - C.  Diff ne cystic duct obstruction resolved and can opacify CBD        Objective:      06/25/18  0900 06/25/18  0915 06/25/18  1000 06/25/18  1100   BP: 97/57 (!) 87/53 110/72 151/70   Pulse: 108 107 108 115   Resp: 24 23 24 (!) 29   Temp: 99 °F (37.2 °C) 99 °F (37.2

## 2018-06-25 NOTE — PROGRESS NOTES
Handoff report given to Diana Camarillo RN CCU receiving patient. Patient has no objective S & S of acute distress at time of handoff.

## 2018-06-25 NOTE — PROGRESS NOTES
During Handoff overnight nurse Saida Pacheco states patient has new skin abrasion on right forehead above eyebrow. Haritha Khoury,  notified.

## 2018-06-25 NOTE — PROGRESS NOTES
[facility] AND [facility] Infectious Disease  Progress Note    Vlad Jensen Patient Status:  Inpatient    1965 MRN [id number]   Location [facility] 2W/SW Attending Caren Casanova MD   Hosp Day # 16 PCP No primary care provider on file.      Norris portion of the duodenum; advancement can be considered. The orogastric tube appears to terminate in the distal stomach.     2. Interval placement of a percutaneous pigtail drainage catheter in the left upper quadrant.  There has been interval decrease in th any diarrhea. Trending temps - will repeat pancultures and consider line changes if needed if temps continue to spike. >35min spent at patient's bedside today in examination and coordination of today's plan of care. D/w nursing. Prognosis very guarded.

## 2018-06-25 NOTE — PROGRESS NOTES
06/25/18 0800   Vitals   Pulse 107   Resp 24   BP (!) 85/55   MAP (mmHg) 65   Oxygen Therapy   SpO2 97 %   ETCO2 (mmHg) 27 mmHg     Patient restarted on Levo see MAR for titration

## 2018-06-25 NOTE — PROGRESS NOTES
Spoke to Dr. Aditya morocho to pause Insulin infusion while patient receiving blood transfusion.   Will try to obtain periphial IV access

## 2018-06-25 NOTE — PROGRESS NOTES
06/25/18 0700   Provider Notification   Reason for Communication Critical value   Provider Name Other (comment)  (Dr. Elva Pacheco)   Method of Communication Call   Response See orders; Other (Comment)  (1 unit PRBC)   Notification Time 0700

## 2018-06-25 NOTE — PROGRESS NOTES
06/25/18 0715   Provider Notification   Reason for Communication Critical value; Other (comment)  (Phos: 10.6, Hgb: 6.3)   Provider Name Other (comment)  (Dr. Jessica Miguel - Nephrology)   Method of Communication Call   Response See orders   Notification Time

## 2018-06-25 NOTE — PROGRESS NOTES
Patient is assisted with ventilator. HD done and removed 2200 ML. reposition done. incontinent of stool. vitals in limit. updated pt status to family.

## 2018-06-25 NOTE — PLAN OF CARE
Safety Risk - Non-Violent Restraints    • Patient will remain free from self-harm Completed          Discontinued restraints.

## 2018-06-25 NOTE — RESPIRATORY THERAPY NOTE
Cpap 5 /ps 5-15 cwp not tolerated,RR 35-40,placed back on full support ,Peak flow increased to 70 LPM. RR down to 30,Patient to get CRRT per [facility].

## 2018-06-25 NOTE — DIETARY NOTE
NUTRITION NOTE UPDATE:  Tube feeds adjusted to renal failure formula Nepro goal rate 60 ml/hr as recommended 6/21 note to provide pt ~2375 kcals, 106 gm protein, 960 ml h20, >100% RDI's, 36  Meq K+.  CRRT occasional clotting o

## 2018-06-26 NOTE — CM/SW NOTE
ZAMZAM received for dc planning. A referral was previously sent to [facility]. They cannot accept pt while on CRRT. SW obtained the Medicaid application from [employer] and sent a copy to [facility] via [employer].  [facility] will reach out to the p

## 2018-06-26 NOTE — PROGRESS NOTES
Vascular Access Note    Vascular Access Screening:   Allergies to Lidocaine: no  Allergies to Latex: no  Presence of Pacemaker/Defibrillator: No  Mastectomy with Lymph Node Dissection: No  AV Fistula / AV Graft: No  Dialysis Catheter: Left Side  [facility]

## 2018-06-26 NOTE — PROGRESS NOTES
[address]    Progress Note    [place] Goes Patient Status:  Inpatient    1965 MRN [id number]   Location [facility] 2W/SW Attending Oma Hoffmann MD   Hosp Day # 18 PCP No primary care provider below    #pulm  -full critical care support in ICU, vent / pressors per pulm  -precedex and fentanyl drips  - s/p trach 6/22 AM- signfiicant oozing post op-resolved    #ID  -ID on consult. IV micafungin started 6/17.   Continue IV meropenem  - having loose Facility-Administered Medications:  heparin sodium 1000 UNIT/ML injection 1,500 Units 1.5 mL Intravenous PRN   meropenem (MERREM) 1 g in sodium chloride 0.9% 100 mL MBP 1 g Intravenous Q12H   NxStage Pure Flow 400 CRRT fluid 5000mL 5 L/hr CRRT Continuous mcg/kg/min Intravenous Continuous   phenylephrine HCl (KRISHNA-SYNEPHRINE) 50 mg in sodium chloride 0.9 % 250 mL infusion 100-200 mcg/min Intravenous Continuous   Insulin Regular Human (NOVOLIN R) 100 Units in sodium chloride 0.9 % 100 mL infusion 1-28 Units/h 4.98*   GFRAA  14*   < >  16*  14*  19*  20*  16*  15*   --   14*   GFRNAA  12*   < >  14*  12*  16*  17*  14*  13*   --   12*   CA  8.2*   < >  8.1*  7.2*  7.9*  8.0*  7.9*  7.8*   --   7.8*   ALB  2.0*   < >  1.9*  1.9*  1.7*  1.9*  1.9*  1.8*   --    --

## 2018-06-26 NOTE — PROGRESS NOTES
[facility] - [facility]    General Surgery Progress Note  Raman Hands  PMB:[id number]  HD# 18       Subjective:   On ventilator, trach in place and sedated  Requiring RRT  Tolerating tube feedings and having BMs - C.  Diff negative  No active bleeding repeat cholecystogram in 2-3 days to see if cystic duct obstruction resolved and can opacify CBD        Objective:      06/26/18  0500 06/26/18  0600 06/26/18  0700 06/26/18  0800   BP: 117/68 110/64 129/70 123/52   Pulse: 103 100 107 105   Resp: 21 21 21 19*   --   21*         Lab Results  Component Value Date   WBC 19.9 06/26/2018   HGB 7.5 06/26/2018   HCT 23.5 06/26/2018   PLT 68 06/26/2018    06/26/2018   K 5.7 06/26/2018    06/26/2018   CO2 21 06/26/2018   BUN 86 06/26/2018   CREATSERUM 4.

## 2018-06-26 NOTE — PLAN OF CARE
Diabetes/Glucose Control    • Glucose maintained within prescribed range Not Progressing        DISCHARGE PLANNING    • Discharge to home or other facility with appropriate resources Not Progressing        GASTROINTESTINAL - ADULT    • Maintains or returns

## 2018-06-26 NOTE — PROGRESS NOTES
[facility] AND [facility]  Gastroenterology Progress Note    Anthony Ax Patient Status:  Inpatient    1965 MRN [id number]   Location [facility] 2W/SW Attending Sergio Hodgson MD   Hosp Day # 25 PCP No primary care provider on file.      Subjectiv splenic vein is thrombosed/occluded. 5. Slight worsening of generalized ascites. 6. Post percutaneous cholecystostomy tube placement. No significant gallbladder distention.  The gallbladder contains vicariously excreted contrast material. There is mild in splenic vein is thrombosed/occluded. May be able to carefully resume heparin at lower doses but CT also raises the possibility of a LUQ peripancreatic fluid collection that because of its heterogeneity could be hematoma.   Need to carefully follow Hb as we

## 2018-06-26 NOTE — PROGRESS NOTES
[facility] dosing service    Initial Pharmacokinetic Consult for Vancomycin Dosing     Matthew Griffith is a 47year old male admitted on 6/8 who is being treated for bacteremia.   Pharmacy has been asked to dose Vancomycin by Dr. Yoandy Esquivel    He is allergic Gram positive cocci in clusters N/A   2.  AEROBIC BACTERIAL CULTURE Status: None   Collection Time: 06/21/18 9:01 AM   Result Value Ref Range   Aerobic Culture Result No Growth 3 Days N/A   Aerobic Smear No WBCs seen N/A   Aerobic Smear No organisms seen N/

## 2018-06-26 NOTE — PROGRESS NOTES
NEPHROLOGY DAILY PROGRESS NOTE       Yahir Lucero Patient Status:  Inpatient    1965 MRN [id number]   Location [facility] 2W/SW Attending Lamonte Levy MD   Hosp Day # 25 PCP No primary care provider on file. Follow up Reason:  JEANNA Saline Flush 0.9 % injection 10 mL 10 mL Intravenous PRN   Normal Saline Flush 0.9 % injection 10 mL 10 mL Intravenous PRN   Dexmedetomidine HCl (PRECEDEX) 1,000 mcg in sodium chloride 0.9 % 250 mL infusion (non-standard concentration)  Intravenous Continu Q24H   Albuterol Sulfate  (90 Base) MCG/ACT inhaler 1 puff 1 puff Inhalation Q4H PRN     Facility-Administered Medications Ordered in Other Encounters:  propofol (DIPRIVAN) injection  Intravenous PRN       Prescriptions Prior to Admission:  Albutero

## 2018-06-26 NOTE — PROGRESS NOTES
Yahir Lucero Patient Status:  Inpatient    1965 MRN [id number]   Location [facility] 2W/SW Attending Lamonte Levy MD   Hosp Day # 25 PCP No primary care provider on file. Critical Care Progress Note      Assessment/Plan:  1.  Acute Intravenous Once   • Vancomycin HCl  125 mg Per G Tube Daily   • micafungin (MYCAMINE) IVPB  100 mg Intravenous Q24H   • triple antibiotic   Topical TID   • Chlorhexidine Gluconate  15 mL Mouth/Throat [email or web address]   • pantoprazole (PROTONIX) IV push  40 m 99  101   --   105   CO2  23   < >  23  23  20*  19*   --   21*   ALKPHO  84   --    --   72  70   --    --    --    AST  160*   --    --   140*  131*   --    --    --    ALT  91*   --    --   58  53   --    --    --    BILT  3.2*   --    --   3.2*  3.7*

## 2018-06-26 NOTE — PLAN OF CARE
GASTROINTESTINAL - ADULT    • Maintains or returns to baseline bowel function Not Progressing        GENITOURINARY - ADULT    • Absence of urinary retention Not Progressing        METABOLIC/FLUID AND ELECTROLYTES - ADULT    • Electrolytes maintained within Family updated on plan of care. Will continue to monitor.

## 2018-06-26 NOTE — WOUND PROGRESS NOTE
WOUND CARE NOTE      PLAN   Recommendations:  Surgery, ID and Pulmonary are following the pt.   Dietary consult for recommendations for nutrition to optimize wound healing  Turn schedules frequently  Heels elevated using pillows, heel wedge or heel boots  06/26/2018   CO2 21 (L) 06/26/2018    (H) 06/26/2018   CA 7.8 (L) 06/26/2018   ALB 1.8 (L) 06/25/2018   ALKPHO 70 06/25/2018   BILT 3.7 (H) 06/25/2018   TP 6.1 06/25/2018    (H) 06/25/2018   ALT 53 06/25/2018   HARPAL 59 06/19/2018   LI

## 2018-06-26 NOTE — PROGRESS NOTES
06/26/18 0845   Readings   ETCO2 (mmHg) 27 mmHg   Total RR 28   Minute Ventilation (L/min) 18 L/min   Expiratory Tidal Volume 620 mL   PIP Observed (cm H2O) 36 cm H2O   MAP (cm H2O) 12   Plateau Pressure (cm H2O) 28 cm H2O   RESPIRATORY THERAPY

## 2018-06-27 NOTE — PLAN OF CARE
GENITOURINARY - ADULT    • Absence of urinary retention Not Progressing        METABOLIC/FLUID AND ELECTROLYTES - ADULT    • Electrolytes maintained within normal limits Not Progressing    • Hemodynamic stability and optimal renal function maintained Not P liquid stool. Tolerating tube feedings via NJ. Mepilex on bottom, heel protectors in place. Repositioning patient every two hours. Will continue to monitor.

## 2018-06-27 NOTE — PROGRESS NOTES
[facility] AND [facility]  Gastroenterology Progress Note    Jason Rasmussen Patient Status:  Inpatient    1965 MRN [id number]   Location [facility] 2W/SW Attending Luisito Maloney MD   [facility] Day # 23 PCP No primary care provider on file.      Subject 6/26/2018  CONCLUSION:   1. Progression of essentially diffuse necrotizing pancreatitis. The low density necrotizing tissue replacing the pancreatic parenchyma has increased in size.   2. Significantly increased size of heterogeneous peripancreatic collecti by (CST): Jaciel Yoon MD on 6/26/2018 at 16:56            Problem list:  Patient Active Problem List:     Hypokalemia     Hyperglycemia     Acute pancreatitis     Acute pancreatitis, unspecified complication status, unspecified pancreatitis type     Gwendolyn Vargas

## 2018-06-27 NOTE — PROGRESS NOTES
[facility] - [facility]    General Surgery Progress Note  Anthony Daniel  JPL:[id number]  HD# 19       Subjective:   On ventilator, trach in place and sedated  Requiring RRT  Tolerating tube feedings and having BMs - C.  Diff negative  No active bleeding obstruction resolved and can opacify CBD        Objective:      06/27/18  1000 06/27/18  1015 06/27/18  1030 06/27/18  1100   BP: (!) 89/55 (!) 81/56 110/81 116/74   Pulse: 92 90 90 92   Resp: 19 19 20 24   Temp:  98.6 °F (37 °C) 98.9 °F (37.2 °C)    TempS 19.0*   WBC  19.9*  17.4*  14.7*   PLT  68*  58*  40*       Recent Labs   Lab  06/26/18   0326  06/26/18   1139  06/26/18 2020 06/27/18 0324   GLU  153*   --   142*  196*   BUN  86*   --   81*  75*   CREATSERUM  4.98*   --   4.49*  4.14*   GFRAA  14*

## 2018-06-27 NOTE — DIETARY NOTE
ADULT NUTRITION REASSESSMENT     Pt is at high nutrition risk. Pt does not meet malnutrition criteria. RECOMMENDATIONS TO MD:  See nutrition intervention for EN nutrient intake and care plans.    Recommend Phos binder if level remains elevated followi with Tracheostomy ( 6/22). remains critically ill with severe gallstone necrotizing pancreatitis with complicated course with sepsis and multiorgan failure with JEANNA requiring CRRT--now HD.   TF has been changed to Nepro, Now at goal rate 60ml/hr, tolerating 0.1 oz)   06/25/18 0500 134.6 kg (296 lb 12.8 oz)   06/24/18 0500 (!) 146.2 kg (322 lb 5 oz)   06/21/18 0500 (!) 138 kg (304 lb 3.8 oz)   06/20/18 0500 (!) 138.1 kg (304 lb 6.4 oz)   06/19/18 0500 135.8 kg (299 lb 6.4 oz)   06/18/18 0600 135.1 kg (297 lb 1 non-pitting all extremities,-   -Skin Integrity: breakdown, RN documentation reviewed and Stage II on right face.   - Mukesh score 12    NUTRITION PRESCRIPTION:  Diet: NPO/TF  ESTIMATED NUTRITION NEEDS:  Calories:1067-1868 calories/day ([facility] equation

## 2018-06-27 NOTE — PROGRESS NOTES
[facility] Pharmacy Note:  Antibiotic Dose Adjustment    Pharmacy received word from nursing that Felicity Cleveland will be switching to hemodialysis today from CRRT. Therefore, the meropenem was adjusted from 1g IV q12h to 1g IV q24h.  Additionally, the vancomycin w

## 2018-06-27 NOTE — CM/SW NOTE
Care Management Update:  Hospital day # 19  Pt discussed in rounds and remains critically ill with severe gallstone necrotizing pancreatitis with complicated course with sepsis and multiorgan failure with JEANNA requiring CRRT/HD who underwent tracheostomy on

## 2018-06-27 NOTE — PROGRESS NOTES
[address]    Progress Note    Javier Nielsen Patient Status:  Inpatient    1965 MRN [id number]   Location [facility] 2W/SW Attending Noe Johns MD   Hosp Day # 19 PCP No primary care provider 6/20 : \"Cholecystogram reveals many stones and obstruction of cystic duct\"  - per d/w Dr Cornelio Delgado 1 week from 6/29    #septic shock  - on vaso and levo, wean as tolerated   - see abx below    #pulm  -full critical care support in ICU, vent / pressors support, weaning down      Objective:   Blood pressure 110/79, pulse 87, temperature 98.3 °F (36.8 °C), temperature source Temporal, resp. rate 19, height 177.8 cm (5' 10\"), weight (!) 319 lb 0.1 oz (144.7 kg), SpO2 98 %.     gen- NAD + diffuse anasarca  h Intravenous PRN   dextrose 10 % infusion  Intravenous Continuous PRN   LORazepam (ATIVAN) injection 1 mg 1 mg Intravenous Q4H PRN   Micafungin Sodium (MYCAMINE) 100 mg in sodium chloride 0.9 % 100 mL IVPB 100 mg Intravenous Q24H   triple antibiotic (NEOSPO 20.3*  22.9*  24.6*  23.5*  20.4*  20.4*  21.8*  21.8*   --    MCV  94.6  95.9   --    --   96.3  98.2  96.5   --    MCH  30.2  30.0   --    --   30.7  30.4  30.2   --    MCHC  31.9*  31.3*   --    --   31.9*  30.9*  31.3*   --    RDW  16.6*  18.1*   -- the cavoatrial junction. Left PICC line projects over the superior vena cava. No pneumothorax. 2. Enteric tube with distal tip not included in the provided field-of-view. Tracheostomy tube tip projects above the level of the fatou.  3. Congestive changes i Xr Chest Ap Portable  (cpt=71045)    Result Date: 6/26/2018  CONCLUSION:  1. Perhaps slight improvement in the CHF when compared to June 19, 2018. 2. Bibasilar lung consolidation/atelectasis. 3. Tracheostomy tube noted. 4. Catheters in place.  5. Feedin

## 2018-06-27 NOTE — PROGRESS NOTES
Vascular Access Note    Vascular Access Screening:   Allergies to Lidocaine: no  Allergies to Latex: no  Presence of Pacemaker/Defibrillator: No  Mastectomy with Lymph Node Dissection: No  AV Fistula / AV Graft: No  Dialysis Catheter: Right Side  [facility]

## 2018-06-27 NOTE — PROGRESS NOTES
NEPHROLOGY DAILY PROGRESS NOTE       Follow up Reason: JEANNA     SUBJECTIVE:  - Circuit went down overnight.    - Remains on vasopressors   - still requiring FiO2 50%     OBJECTIVE:    Total Intake/Output:    Intake/Output Summary (Last 24 hours) at 06/27/18 Per G Tube Daily   Normal Saline Flush 0.9 % injection 10 mL 10 mL Intravenous PRN   Normal Saline Flush 0.9 % injection 10 mL 10 mL Intravenous PRN   Dexmedetomidine HCl (PRECEDEX) 1,000 mcg in sodium chloride 0.9 % 250 mL infusion (non-standard concentra IV push 40 mg Intravenous Q24H   Albuterol Sulfate  (90 Base) MCG/ACT inhaler 1 puff 1 puff Inhalation Q4H PRN     Facility-Administered Medications Ordered in Other Encounters:  propofol (DIPRIVAN) injection  Intravenous PRN       Prescriptions Citlali

## 2018-06-27 NOTE — PROGRESS NOTES
Vascular Access Note  Midline exchange  Vascular Access Screening:   Allergies to Lidocaine: no  Allergies to Latex: no  Presence of Pacemaker/Defibrillator: No  Mastectomy with Lymph Node Dissection: No  AV Fistula / AV Graft: No  Dialysis Catheter: Right

## 2018-06-27 NOTE — PHYSICAL THERAPY NOTE
Confirmed with RN, patient to be placed on restorative care for daily range of motion. RN aware. Current orders completed and acknowledged.

## 2018-06-27 NOTE — PROGRESS NOTES
[facility] AND [facility] Infectious Disease  Progress Note    Hortensia Plate Patient Status:  Inpatient    1965 MRN [id number]   Location [facility] 2W/SW Attending Adria Magaña MD   [facility] Day [facility] PCP No primary care provider on file. replacing the pancreatic parenchyma has increased in size. 2. Significantly increased size of heterogeneous peripancreatic collection extending into the left upper quadrant, resulting in rightward deviation of the stomach.  The extent of heterogeneity o failure, possible ARDS  - Intubated 6/11, pulmonary following  - Now s/p trach     4.  ARF now on HD  - Due to above, severe sepsis and pancreatitis  - Dialysis/CRRT as needed     5.  Disposition - inpatient.  Continue meropenem and micafungin.   Continue I

## 2018-06-27 NOTE — PROGRESS NOTES
06/27/18 0725   Readings   Total RR 19   Minute Ventilation (L/min) 12 L/min   Expiratory Tidal Volume 620 mL   PIP Observed (cm H2O) 34 cm H2O   MAP (cm H2O) 12   Plateau Pressure (cm H2O) 29 cm H2O   RESPIRATORY THERAPY MECHANICAL VENTILATION PROGRESS

## 2018-06-27 NOTE — WOUND PROGRESS NOTE
Specialty bed  Follow up on the pt., the pt's nurse is at the bedside, the pt. is not currently on CRRT, per the nurse he will start hemodialysis, discussed my recommendation for a specialty bed, ordered the pt. a [facility] bed for prevention, this will be d

## 2018-06-27 NOTE — PROGRESS NOTES
Critical Care Progress Note     Assessment / Plan:  1. Acute respiratory failure - due to pulm edema/effusion, pancreatitis, sepsis  - s/p trach 6/22  - daily PS trial as tolerated  - minimize sedation.  No propofol due to elevated trigs  - volume managemen appropriately    Medications:  Reviewed in EMR    Lab Data:  Reviewed in EMR    Imaging:  Chest imaging reviewed

## 2018-06-28 NOTE — PROGRESS NOTES
[facility]  Gastroenterology Progress Note    Noel Watts Patient Status:  Inpatient    1965 MRN [id number]   Location [facility] 2W/SW Attending Kirit Winslow MD   Hosp Day # 20 PCP No primary care provider on file.      Subject catheter removed, tip sent for cultures and sensitivities.    Dictated by (CST): Lucho Marsh MD on 6/26/2018 at 16:51     Approved by (CST): Lucho Marsh MD on 6/26/2018 at 16:56            Problem list:  Patient Active Problem List:     Hypokalemia

## 2018-06-28 NOTE — PLAN OF CARE
Problem: PAIN - ADULT  Goal: Verbalizes/displays adequate comfort level or patient's stated pain goal  INTERVENTIONS:  - Encourage pt to monitor pain and request assistance  - Assess pain using appropriate pain scale  - Administer analgesics based on type consumed  - Identify factors contributing to decreased intake, treat as appropriate  - Assist with meals as needed  - Monitor I&O, WT and lab values  - Obtain nutritional consult as needed  - Optimize oral hygiene and moisture  - Encourage food from home; METABOLIC/FLUID AND ELECTROLYTES - ADULT  Goal: Glucose maintained within prescribed range  INTERVENTIONS:  - Monitor Blood Glucose as ordered  - Assess for signs and symptoms of hyperglycemia and hypoglycemia  - Administer ordered medications to maintain

## 2018-06-28 NOTE — PROGRESS NOTES
NEPHROLOGY DAILY PROGRESS NOTE       Follow up Reason: JEANNA     SUBJECTIVE:  - Tolerated iHD yesterday, did not have to increase vasopressor support   - remains on levophed, vasopressin weaned off   - FiO2 40%     OBJECTIVE:    Total Intake/Output:    Intak 10 mL Intravenous PRN   Normal Saline Flush 0.9 % injection 10 mL 10 mL Intravenous PRN   Dexmedetomidine HCl (PRECEDEX) 1,000 mcg in sodium chloride 0.9 % 250 mL infusion (non-standard concentration)  Intravenous Continuous   Normal Saline Flush 0.9 % inj (90 Base) MCG/ACT inhaler 1 puff 1 puff Inhalation Q4H PRN     Facility-Administered Medications Ordered in Other Encounters:  propofol (DIPRIVAN) injection  Intravenous PRN       Prescriptions Prior to Admission:  Albuterol Sulfate  (90 Base) MCG/A

## 2018-06-28 NOTE — CM/SW NOTE
Pt discussed during dc rounds. Pt is being started on HD. Update discussed with Froylan hernandez/Evon [phone] and clinicals added to allscripts.     WILFREDO carolina [phone]

## 2018-06-28 NOTE — PROGRESS NOTES
[facility] AND [facility] Infectious Disease Progress Note    Mala Ritchei Patient Status:  Inpatient    1965 MRN [id number]   [facility] 2W/SW Attending Boston Giordano MD   Hosp Day # 20 PCP No primary care provider on file.      Sub 0.9%  NaCl infusion, , Intravenous, PRN Dialysis  •  Norepinephrine Bitartrate (LEVOPHED) 8 mg in dextrose 5 % 250 mL infusion, 0.5-30 mcg/min, Intravenous, Continuous  •  Normal Saline Flush 0.9 % injection 10 mL, 10 mL, Intravenous, PRN  •  dextrose 10 % Exam:  General: Alert, orientated x3. Cooperative. No apparent distress. Vital Signs:  Blood pressure 128/72, pulse 99, temperature 98.3 °F (36.8 °C), resp. rate 20, height 5' 10\" (1.778 m), weight 294 lb (133.4 kg), SpO2 94 %.    Temp (24hrs), Av.3 ROSA DUNNE  6/21/2018  9:55 AM

## 2018-06-28 NOTE — PLAN OF CARE
DISCHARGE PLANNING    • Discharge to home or other facility with appropriate resources Not Progressing        GASTROINTESTINAL - ADULT    • Maintains or returns to baseline bowel function Not Progressing        GENITOURINARY - ADULT    • Absence of urinary

## 2018-06-28 NOTE — PROGRESS NOTES
Gurinder Correa Patient Status:  Inpatient    1965 MRN [id number]   Location [facility] 2W/SW Attending Julio Archer MD   Hosp Day # 20 PCP No primary care provider on file. Critical Care Progress Note      Assessment/Plan:  1.  Acut Intravenous Q24H   • triple antibiotic   Topical TID   • Chlorhexidine Gluconate  15 mL Mouth/Throat [email or web address]   • pantoprazole (PROTONIX) IV push  40 mg Intravenous Q24H       Vent Mode: VC/AC  FiO2 (%):  [40 %-50 %] 40 %  S RR:  [14] 14  S VT:  [600 < >  22  22  22   ALKPHO  84   --    --   72  70   --    --    --    --    AST  160*   --    --   140*  131*   --    --    --    --    ALT  91*   --    --   58  53   --    --    --    --    BILT  3.2*   --    --   3.2*  3.7*   --    --    --    --    TP

## 2018-06-28 NOTE — PROGRESS NOTES
[facility]    General Surgery Progress Note  eFlicity Cleveland  [facility]:[id number]  HD# 20       Subjective:   On ventilator, trach in place and sedated  Requiring RRT  Tolerating tube feedings and having BMs - C.  Diff negative  No active bleeding CBD        Objective:      06/28/18  1000 06/28/18  1100 06/28/18  1200 06/28/18  1300   BP: 128/72 108/73 120/74 143/85   Pulse: 99 99 99 98   Resp: 20 21 19 26   Temp:   98.4 °F (36.9 °C)    TempSrc:   Temporal    SpO2: 94% 95% 95% 96%   Weight:       He

## 2018-06-28 NOTE — PROGRESS NOTES
[address]    Progress Note    Yahir Lucero Patient Status:  Inpatient    1965 MRN [id number]   Location [facility] 2W/SW Attending Tolu Freedman MD   Hosp Day # 20 PCP No primary care provider cholangiogram / ccy tube 6/20 : \"Cholecystogram reveals many stones and obstruction of cystic duct\"  - per d/w Dr Anthony Cruz 1 week from 6/29    #septic shock  - on vaso and levo, wean as tolerated   - see abx below    #pulm  -full critical care suppor changes      Objective:   Blood pressure 113/71, pulse 99, temperature 98.4 °F (36.9 °C), temperature source Temporal, resp.  rate 20, height 177.8 cm (5' 10\"), weight 294 lb (133.4 kg), SpO2 94 %.    gen- NAD + diffuse anasarca  heent- trach site c/d/i  L 10 mL 10 mL Intravenous PRN   0.9%  NaCl infusion  Intravenous PRN Dialysis   Norepinephrine Bitartrate (LEVOPHED) 8 mg in dextrose 5 % 250 mL infusion 0.5-30 mcg/min Intravenous Continuous   Normal Saline Flush 0.9 % injection 10 mL 10 mL Intravenous PRN 06/26/18 2020 06/27/18   0324  06/27/18   1054  06/27/18   1643  06/27/18   2328  06/28/18   0518  06/28/18   1330   RBC  2.11*   --   2.44*  2.08*  2.26*   --    --    --   2.52*   --    HGB  6.3*   < >  7.5*  6.3*  6.3*  6.8*  6.8*  7.9*  8.5*  7.6* 140*  131*   --    --    --    --    --    --    --    ALT  91*   --    --   58  53   --    --    --    --    --    --    --    BILT  3.2*   --    --   3.2*  3.7*   --    --    --    --    --    --    --    TP  6.3   --    --   6.1  6.1   --    --    --

## 2018-06-28 NOTE — WOUND PROGRESS NOTE
Pt seen for follow up. Pt was placed on the [facility] bed last pm per bedside RN. Pt remains extubated.

## 2018-06-28 NOTE — PROGRESS NOTES
Hematology/Oncology  Progress Note        NAME: Raman Martinez - ROOM: 219/219-A - MRN: [id number] - Age: 47year old - : 1965    Subjective:  Still in ICU, s/p trach  Hematology reconsulted for for anticoagulation in the setting of thrombocytopenia history  Psyche: denies depression or anxiety  Hematologic: per HPI  Endocrine: denies thyroid history      Physical Exam:  /81 (BP Location: Right arm)   Pulse 89   Temp 98.4 °F (36.9 °C)   Resp 20   Ht 1.778 m (5' 10\")   Wt 133.4 kg (294 lb)   SpO 6.1   --    --    --    --     < > = values in this interval not displayed. Assessment/Plan:  Mr. Keenan Shea is a 47year old gentleman with a history of asthma found to have necrotizing pancreatitis and mesenteric, portal and splenic vein thrombosis.

## 2018-06-29 NOTE — PLAN OF CARE
Problem: PAIN - ADULT  Goal: Verbalizes/displays adequate comfort level or patient's stated pain goal  INTERVENTIONS:  - Encourage pt to monitor pain and request assistance  - Assess pain using appropriate pain scale  - Administer analgesics based on type consumed  - Identify factors contributing to decreased intake, treat as appropriate  - Assist with meals as needed  - Monitor I&O, WT and lab values  - Obtain nutritional consult as needed  - Optimize oral hygiene and moisture  - Encourage food from home; METABOLIC/FLUID AND ELECTROLYTES - ADULT  Goal: Hemodynamic stability and optimal renal function maintained  INTERVENTIONS:  - Monitor labs and assess for signs and symptoms of volume excess or deficit  - Monitor intake, output and patient weight  - Praveen Ayala

## 2018-06-29 NOTE — PROGRESS NOTES
[facility] - [facility]    General Surgery Progress Note  Yfn Sensing  [facility]:[id number]  HD# 21       Subjective:   On ventilator, trach in place and sedated  Requiring RRT  Tolerating tube feedings and having BMs - C.  Diff negative  No active bleeding filling defects - images reviewed        Objective:      06/29/18  0500 06/29/18  0600 06/29/18  0800 06/29/18  1000   BP: 119/76 122/85 (!) 85/58 93/42   Pulse: 106 115 97 100   Resp: 20 20 16 12   Temp:   98.9 °F (37.2 °C)    TempSrc:   Temporal    SpO2: HGB 7.6 06/29/2018   HCT 24.0 06/29/2018    06/29/2018    06/29/2018    06/29/2018   K 5.0 06/29/2018    06/29/2018   CO2 23 06/29/2018   BUN 51 06/29/2018   CREATSERUM 3.69 06/29/2018    06/29/2018   MG 2.3 06/29/2018

## 2018-06-29 NOTE — PROGRESS NOTES
[facility]  Gastroenterology Progress Note    [place] Patient Patient Status:  Inpatient    1965 MRN [id number]   Location [facility] 2W/SW Attending Ian Au MD   Hosp Day # 21 PCP No primary care provider on file.      Subject SMV thrombosis, thrombocytopenia with negative HIT, recurrent anemia. More hypotensive today, abdomen more distended and fecal output decreasing. Concerned about his ability to continue tolerating nasojejunal tube feedings.   Stopping these would increase

## 2018-06-29 NOTE — PROGRESS NOTES
Called by RN for increasing pressor requirements. No response to fluid bolus. No PTX on CXR, relatively stable. Abdomen increasingly tense. Requested bladder pressure which returned 32cwp c/w with intrabdominal compartment syndrome.  Discussed with surgery

## 2018-06-29 NOTE — PROGRESS NOTES
NEPHROLOGY DAILY PROGRESS NOTE       Follow up Reason: JEANNA     SUBJECTIVE:  -  2.5L removed with iHD yesterday   - remains on levophed     OBJECTIVE:    Total Intake/Output:    Intake/Output Summary (Last 24 hours) at 06/29/18 1045  Last data filed at 06/2 PRN   Normal Saline Flush 0.9 % injection 10 mL 10 mL Intravenous PRN   Dexmedetomidine HCl (PRECEDEX) 1,000 mcg in sodium chloride 0.9 % 250 mL infusion (non-standard concentration)  Intravenous Continuous   Normal Saline Flush 0.9 % injection 10 mL 10 mL inhaler 1 puff 1 puff Inhalation Q4H PRN     Facility-Administered Medications Ordered in Other Encounters:  propofol (DIPRIVAN) injection  Intravenous PRN       Prescriptions Prior to Admission:  Albuterol Sulfate  (90 Base) MCG/ACT Inhalation Aero

## 2018-06-29 NOTE — PROGRESS NOTES
[facility]    Noel [place] Patient Status:  Inpatient    1965 MRN [id number]   Location [facility] 2W/SW Attending Elder Bloom MD   Hosp Day # 21 PCP No primary care provider supplement nutrition with TPN as needed  - cholangiogram / ccy tube 6/20 : \"Cholecystogram reveals many stones and obstruction of cystic duct\"  - per d/w Dr Ping Concepcion,  Anticipated OR     #septic shock  - Cont levo, off vaso , wean as tolerated   - see ab o/n, trach, on pressor support, weaning down, no new changes      Objective:   Blood pressure 93/42, pulse 100, temperature 98.9 °F (37.2 °C), temperature source Temporal, resp. rate 12, height 177.8 cm (5' 10\"), weight 295 lb (133.8 kg), SpO2 97 %.     ge Intravenous Continuous   Normal Saline Flush 0.9 % injection 10 mL 10 mL Intravenous PRN   0.9%  NaCl infusion  Intravenous PRN Dialysis   Norepinephrine Bitartrate (LEVOPHED) 8 mg in dextrose 5 % 250 mL infusion 0.5-30 mcg/min Intravenous Continuous   Nor Epic  Recent Labs   Lab  06/26/18   0326  06/26/18 2020 06/27/18   0324  06/27/18   1054   06/27/18   2328  06/28/18   0518  06/28/18   1330  06/28/18   2058  06/29/18   0515   RBC  2.44*  2.08*  2.26*   --    --    --   2.52*   --    --   2.51*   HGB --    --    --    --    --    ALT   --    --   58  53   --    --    --    --    --    --    --    BILT   --    --   3.2*  3.7*   --    --    --    --    --    --    --    TP   --    --   6.1  6.1   --    --    --    --    --    --    --     < > = values in

## 2018-06-29 NOTE — PROGRESS NOTES
[facility] AND [facility] Infectious Disease Progress Note    Shiloh Weathers Patient Status:  Inpatient    1965 MRN [id number]   Location [facility] 2W/SW Attending Julia Jarrett MD   Hosp Day # 21 PCP No primary care provider on file.      Mike Steele % 250 mL infusion (non-standard concentration), , Intravenous, Continuous  •  Normal Saline Flush 0.9 % injection 10 mL, 10 mL, Intravenous, PRN  •  0.9%  NaCl infusion, , Intravenous, PRN Dialysis  •  Norepinephrine Bitartrate (LEVOPHED) 8 mg in dextrose Inhalation, Q4H PRN    Facility-Administered Medications Ordered in Other Encounters:   •  propofol (DIPRIVAN) injection, , Intravenous, PRN    Physical Exam:  General: Alert, orientated x3. Cooperative. No apparent distress.   Vital Signs:  Blood pressur with many stones, obstruction in cystic duct - s/p drain placement with f/u cholangiogram planned  - SMV thrombus as well  - Lipase was 8300 on admission  - Some social EtOH, nothing heavy  - IV meropenem and micafungin ongoing, also on Oral Vanc prophylac

## 2018-06-29 NOTE — RESPIRATORY THERAPY NOTE
Patient placed on CPAP 5 / PS 5. Patient respiratory rates went up to 34 to 38. Desaturated to low 85% , 2 mins on the CPAP mode. Placed back on Full support. Notified Dr. Ash Vogt.

## 2018-06-29 NOTE — PROGRESS NOTES
Vascular Access Note    Vascular Access Screening:   Allergies to Lidocaine: no  Allergies to Latex: no  Presence of Pacemaker/Defibrillator: No  Mastectomy with Lymph Node Dissection: No  AV Fistula / AV Graft: No  Dialysis Catheter: Right Side  [facility]

## 2018-06-29 NOTE — RESTORATIVE THERAPY
RESTORATIVE CARE TREATMENT NOTE    Presenting Problem             Precautions          Weight Bearing Restriction                      SUNDAY MONDAY TUESDAY WEDNESDAY THURSDAY FRIDAY SATURDAY   TRANSFERS          Bed <-> Chair          Sit <-> Stand      N

## 2018-06-29 NOTE — PROGRESS NOTES
Yahir Lucero Patient Status:  Inpatient    1965 MRN [id number]   Location [facility] 2W/SW Attending Ovidio Stuart MD   Hosp Day # 21 PCP No primary care provider on file. Critical Care Progress Note      Assessment/Plan:  1.  Acut Human  10 Units Intravenous Once   • Vancomycin HCl  125 mg Per G Tube Daily   • micafungin (MYCAMINE) IVPB  100 mg Intravenous Q24H   • triple antibiotic   Topical TID   • Chlorhexidine Gluconate  15 mL Mouth/Throat [facility]   • pantoprazole (PROTONI --    AST   --   140*  131*   --    --    --    --    ALT   --   58  53   --    --    --    --    BILT   --   3.2*  3.7*   --    --    --    --    TP   --   6.1  6.1   --    --    --    --     < > = values in this interval not displayed.      Recent Labs

## 2018-06-30 NOTE — PLAN OF CARE
[facility] notified about rental [facility] bed, bed no longer needed, pt was discharged as of 12 noon, They will  in next 1937 [facility]. [phone]

## 2018-06-30 NOTE — PROGRESS NOTES
Pt on 4 pressors at this time. Sedation turned off. Pt unresponsive and does not respond to painful stimuli. Pt received dialysis over night and tolerated for 2hrs. 1340 taken off. Labs repeated. Family at bedside.  Pt made DNR this morning by sister and 3

## 2018-06-30 NOTE — PLAN OF CARE
[facility] updated on time of death 805/reference [id number]. Spoke with la antoine. , pt is a candidate for BONE, eye & tissue.  Family is at bedside, deciding on  hm.gift of hope notified at [address] am.

## 2018-06-30 NOTE — PLAN OF CARE
Time of death 56, dr Naty Ng, dr Val Craig, nephrology on case notified time of death. Family at bedside, pastoral care notified, gift of hope notified, secrutity notified.

## 2018-06-30 NOTE — PLAN OF CARE
Per gift of [place] corrdinator la antoine reviewed history. Pt is NOT a candidate for any donation. Reference [id number].

## 2018-06-30 NOTE — PROGRESS NOTES
The patient was made DNR and  this morning. Family was at the bedside. They were appreciative of the care that he received.   CAROLYN/mushtaq Booker and answered all questions

## 2018-07-01 LAB — BLOOD TYPE BARCODE: 5100

## 2018-08-11 NOTE — DISCHARGE SUMMARY
[facility] Hospitalist Discharge Summary   Patient ID:  Matthew Griffith  [id number]  19 year old  8/9/1965    Admit date: 6/8/2018  Discharge date: 6/30/2018  Primary Care Physician: No primary care provider on file.    Attending Physician: No att. providers found anemia, SMV thrombosis. On evening 6/29, pt developed markedly increased abdominal pressure with signs of abdominal compartment syndrome as well as worsening septic shock. Pt required 4 pressors without improvement.  Pt made DNR am on 6/30, pt passed away

## 2021-08-20 NOTE — PROGRESS NOTES
RESPIRATORY THERAPY MECHANICAL VENTILATION PROGRESS NOTE    Ventilator Weaning:  Patient meets criteria for weaning? no Weaning was attempted no    Current Ventilator Data:       06/13/18 0745   Readings   Total RR 27   Minute Ventilation (L/min) 13.4 L/mi Never smoker

## 2023-03-30 NOTE — LETTER
[address] [place] [address] Rd, [place], [place]     AUTHORIZATION FOR SURGICAL OPERATION OR PROCEDURE    I hereby authorize Dr. Adriana Pina MD, my Physician(s) and whomever may be designated as the doctor's Assistant, to perform the fol 4. I consent to the photographing of procedure(s) to be performed for the purposes of advancing medicine, science and/or education, provided my identity is not revealed.  If the procedure has been videotaped, the physician/surgeon will obtain the original v (Witness signature)                                                                                                  (Date)                                (Time)  STATEMENT OF PHYSICIAN My signature below affirms that prior to the time of the procedure;  I awake/alert/oriented to person, place, time/situation

## 2024-09-09 NOTE — WOUND PROGRESS NOTE
09/09/24 1559   Discharge Planning   Expected Discharge Disposition   (TBD)         Patient remains in ICU. He is still intubated and has right chest tube. Still unable to determine discharge plan. Will continue to follow for discharge needs.   Wound Care Services  Follow up on the pt., he is still on CRRT, pt. unable to move to another bed at this time, will continue to follow up for when the pt. can be moved to a specialty bed,  the nurse is aware.

## 2024-12-27 NOTE — LETTER
Called patient to schedule appt.   [address] [place], [place]  [id number]  INFORMED CONSENT FOR TRANSFUSION OF BLOOD OR BLOOD PRODUCTS  My physician has informed me of the nature, purpose, benefits and risks of transfusion for blood and blood components that ______________________________________________  (Signature of Patient)                                                            (Responsible party in case of Minor,

## (undated) DEVICE — ENDOSCOPY PACK UPPER: Brand: MEDLINE INDUSTRIES, INC.

## (undated) DEVICE — Device

## (undated) DEVICE — FORCEP RADIAL JAW 4

## (undated) DEVICE — SUTURE SILK 2-0 SA65H

## (undated) DEVICE — SYRINGE MNJCT 35ML LF STRL LL

## (undated) DEVICE — SUTURE CHROMIC 2-0 893H

## (undated) DEVICE — HEAD AND NECK CDS-LF: Brand: MEDLINE INDUSTRIES, INC.

## (undated) DEVICE — SOLUTION IRR BTL 250CC NACL

## (undated) DEVICE — NASAL JEJUNAL FEEDING TUBE: Brand: COOK

## (undated) DEVICE — CANNULA NASAL 02/C02 ADULT

## (undated) DEVICE — BLADE 11 SHRP BP SS SRG STRL

## (undated) DEVICE — CURVED, SMALL JAW, OPEN SEALER/DIVIDER: Brand: LIGASURE

## (undated) DEVICE — DRAPE SHEET LG

## (undated) DEVICE — Device: Brand: DEFENDO AIR/WATER/SUCTION AND BIOPSY VALVE

## (undated) DEVICE — OCCLUSIVE GAUZE STRIP,3% BISMUTH TRIBROMOPHENATE IN PETROLATUM BLEND: Brand: XEROFORM

## (undated) DEVICE — Device: Brand: PORTEX

## (undated) DEVICE — Device: Brand: BALLOON3

## (undated) DEVICE — STERILE LATEX POWDER-FREE SURGICAL GLOVESWITH NITRILE COATING: Brand: PROTEXIS

## (undated) DEVICE — SUTURE PROLENE 2-0 SH

## (undated) DEVICE — SOL  .9 1000ML BTL

## (undated) DEVICE — CHLORAPREP 26ML APPLICATOR

## (undated) DEVICE — SUTURE ETHILON 3-0 669H

## (undated) NOTE — LETTER
[address] [facility], [facility] [address]     I agree to have a Peripherally Inserted Central Catheter (PICC) placed in my arm.      1. The PICC insertion procedure, care, maintenance, risks, benefits, and complications Statement of Physician: My signature below affirms that prior to the time of the PICC line insertion, I have explained to the patient and/or his/her legal representative, the risks and benefits involved in the proposed treatment and any reasonable alternat

## (undated) NOTE — LETTER
[address], [place]  Authorization for Invasive Procedures  1.  I hereby authorize Dr. Lawanda Pham , my physician and whomever may be designated as the doctor's assistant, to perform the following operation and/or procedure:  ENDOSCOP 5. I consent to the photographing of the operations or procedures to be performed for the purposes of advancing medicine, science, and/or education, provided my identity is not revealed.  If the procedure has been videotaped, the physician/surgeon will obta __________ Time: ___________    Statement of Physician  My signature below affirms that prior to the time of the procedure, I have explained to the patient and/or his legal representative, the risks and benefits involved in the proposed treatment and any r

## (undated) NOTE — LETTER
[address] [place] [address] Rd, [place], [place]     AUTHORIZATION FOR SURGICAL OPERATION OR PROCEDURE    I hereby authorize Dr. Bandar Montano my Physician(s) and whomever may be designated as the doctor's Assistant, to perform the followi 4. I consent to the photographing of procedure(s) to be performed for the purposes of advancing medicine, science and/or education, provided my identity is not revealed.  If the procedure has been videotaped, the physician/surgeon will obtain the original v (Witness signature)                                                                                                  (Date)                                (Time)  STATEMENT OF PHYSICIAN My signature below affirms that prior to the time of the procedure;  I

## (undated) NOTE — LETTER
[address], [place], [place]     AUTHORIZATION FOR SURGICAL OPERATION OR PROCEDURE    I hereby authorize Dr. Christ Ochoa MD, my Physician(s) and whomever may be designated as the doctor's Assistant, to perform the fol 4. I consent to the photographing of procedure(s) to be performed for the purposes of advancing medicine, science and/or education, provided my identity is not revealed.  If the procedure has been videotaped, the physician/surgeon will obtain the original v (Witness signature)                                                                                                  (Date)                                (Time)  STATEMENT OF PHYSICIAN My signature below affirms that prior to the time of the procedure;  I

## (undated) NOTE — LETTER
[address], [place]  Authorization for Invasive Procedures  1. I hereby authorize Dr. Priynaka Duenas, my physician and whomever may be designated as the doctor's assistant, to perform the following operation and/or procedure:   Tunneled silvana performed for the purposes of advancing medicine, science, and/or education, provided my identity is not revealed. If the procedure has been videotaped, the physician/surgeon will obtain the original videotape.  The hospital will not be responsible for stor My signature below affirms that prior to the time of the procedure, I have explained to the patient and/or his legal representative, the risks and benefits involved in the proposed treatment and any reasonable alternative to the proposed treatment.  I have

## (undated) NOTE — LETTER
[facility]  Administration of Anesthesia  1. I, Jarad Hurtado, or _________________________________ acting on his behalf, (Patient) (Dependent/Representative) request to receive anesthesia for my pending procedure/operation/treatment.   A infections, high spinal block, spinal bleeding, seizure, cardiac arrest and death. 7. AWARENESS: I understand that it is possible (but unlikely) to have explicit memory of events from the operating room while under general anesthesia.   8. ELECTROCONVULSIV unconscious pt /Relationship    My signature below affirms that prior to the time of the procedure, I have explained to the patient and/or his/her guardian, the risks and benefits of undergoing anesthesia, as well as any reasonable alternatives.     _______

## (undated) NOTE — LETTER
[address], [place]  Authorization for Invasive Procedures  1.  I hereby authorize                       , my physician and whomever may be designated as the doctor's assistant, to perform the following operation and/or procedu performed for the purposes of advancing medicine, science, and/or education, provided my identity is not revealed. If the procedure has been videotaped, the physician/surgeon will obtain the original videotape.  The hospital will not be responsible for stor My signature below affirms that prior to the time of the procedure, I have explained to the patient and/or his legal representative, the risks and benefits involved in the proposed treatment and any reasonable alternative to the proposed treatment.  I have

## (undated) NOTE — LETTER
[address], [place]  Authorization for Invasive Procedures  1.  I hereby authorize Souleymane Quintero , my physician and whomever may be designated as the doctor's assistant, to perform the following operation and/or procedure:  Maureen Lozano 5. I consent to the photographing of the operations or procedures to be performed for the purposes of advancing medicine, science, and/or education, provided my identity is not revealed.  If the procedure has been videotaped, the physician/surgeon will obta __________ Time: ___________    Statement of Physician  My signature below affirms that prior to the time of the procedure, I have explained to the patient and/or his legal representative, the risks and benefits involved in the proposed treatment and any r

## (undated) NOTE — LETTER
[address] [facility], [facility] [address]     I agree to have a Peripherally Inserted Central Catheter (PICC) placed in my arm.      1. The PICC insertion procedure, care, maintenance, risks, benefits, and complications Statement of Physician: My signature below affirms that prior to the time of the PICC line insertion, I have explained to the patient and/or his/her legal representative, the risks and benefits involved in the proposed treatment and any reasonable alternat

## (undated) NOTE — LETTER
[address] [place] [address] Rd, [place], [place]     AUTHORIZATION FOR SURGICAL OPERATION OR PROCEDURE    I hereby authorize Dr. Carlo Jay, my Physician(s) and whomever may be designated as the doctor's Assistant, to perform the following oper 4. I consent to the photographing of procedure(s) to be performed for the purposes of advancing medicine, science and/or education, provided my identity is not revealed.  If the procedure has been videotaped, the physician/surgeon will obtain the original v (Witness signature)                                                                                                  (Date)                                (Time)  STATEMENT OF PHYSICIAN My signature below affirms that prior to the time of the procedure;  I

## (undated) NOTE — LETTER
[address], [place]  Authorization for Invasive Procedures  1.  I hereby authorize                         , my physician and whomever may be designated as the doctor's assistant, to perform the following operation and/or proce performed for the purposes of advancing medicine, science, and/or education, provided my identity is not revealed. If the procedure has been videotaped, the physician/surgeon will obtain the original videotape.  The hospital will not be responsible for stor My signature below affirms that prior to the time of the procedure, I have explained to the patient and/or his legal representative, the risks and benefits involved in the proposed treatment and any reasonable alternative to the proposed treatment.  I have

## (undated) NOTE — LETTER
[address] [place] [address]  Authorization for Invasive Procedures  1.  I hereby authorize Dr. Reuben Edwards , my physician and whomever may be designated as the doctor's assistant, to perform the following operation and/or procedure: Saurabh Cooper performed for the purposes of advancing medicine, science, and/or education, provided my identity is not revealed. If the procedure has been videotaped, the physician/surgeon will obtain the original videotape.  The hospital will not be responsible for stor My signature below affirms that prior to the time of the procedure, I have explained to the patient and/or his legal representative, the risks and benefits involved in the proposed treatment and any reasonable alternative to the proposed treatment.  I have

## (undated) NOTE — LETTER
[facility], [place]  Authorization for Invasive Procedures  1.  I hereby authorize Dr. Sunshine Lee, my physician and whomever may be designated as the doctor's assistant, to perform the following operation and/or procedure:  Esophagogastr performed for the purposes of advancing medicine, science, and/or education, provided my identity is not revealed. If the procedure has been videotaped, the physician/surgeon will obtain the original videotape.  The hospital will not be responsible for stor My signature below affirms that prior to the time of the procedure, I have explained to the patient and/or his legal representative, the risks and benefits involved in the proposed treatment and any reasonable alternative to the proposed treatment.  I have

## (undated) NOTE — LETTER
[address], [place]  Authorization for Invasive Procedures  1.  I hereby authorize Dr. Irving Kinney , my physician and whomever may be designated as the doctor's assistant, to perform the following operation and/or procedure:  Ultra 5. I consent to the photographing of the operations or procedures to be performed for the purposes of advancing medicine, science, and/or education, provided my identity is not revealed.  If the procedure has been videotaped, the physician/surgeon will obta __________ Time: ___________    Statement of Physician  My signature below affirms that prior to the time of the procedure, I have explained to the patient and/or his legal representative, the risks and benefits involved in the proposed treatment and any r

## (undated) NOTE — LETTER
[facility], [place]  Authorization for Invasive Procedures  1.  I hereby authorize Dr. Sun Satrr , my physician and whomever may be designated as the doctor's assistant, to perform the following operation and/or procedure:  Chung Taylor performed for the purposes of advancing medicine, science, and/or education, provided my identity is not revealed. If the procedure has been videotaped, the physician/surgeon will obtain the original videotape.  The hospital will not be responsible for stor My signature below affirms that prior to the time of the procedure, I have explained to the patient and/or his legal representative, the risks and benefits involved in the proposed treatment and any reasonable alternative to the proposed treatment.  I have

## (undated) NOTE — LETTER
[address], [place]  Authorization for Invasive Procedures  1.  I hereby authorize Dasha Mcdonald, my physician and whomever may be designated as the doctor's assistant, to perform the following operation and/or procedure:  Replace 5. I consent to the photographing of the operations or procedures to be performed for the purposes of advancing medicine, science, and/or education, provided my identity is not revealed.  If the procedure has been videotaped, the physician/surgeon will obta __________ Time: ___________    Statement of Physician  My signature below affirms that prior to the time of the procedure, I have explained to the patient and/or his legal representative, the risks and benefits involved in the proposed treatment and any r

## (undated) NOTE — LETTER
[address] [place] [address]  Authorization for Invasive Procedures  1.  I hereby authorize Dr. Cally Milian  , my physician and whomever may be designated as the doctor's assistant, to perform the following operation and/or procedure: performed for the purposes of advancing medicine, science, and/or education, provided my identity is not revealed. If the procedure has been videotaped, the physician/surgeon will obtain the original videotape.  The hospital will not be responsible for stor My signature below affirms that prior to the time of the procedure, I have explained to the patient and/or his legal representative, the risks and benefits involved in the proposed treatment and any reasonable alternative to the proposed treatment.  I have

## (undated) NOTE — LETTER
[address]  [facility] [address], [place], [place]  [address]  INFORMED CONSENT FOR TRANSFUSION OF BLOOD OR BLOOD PRODUCTS  My physician has informed me of the nature, purpose, benefits and risks of transfusion for blood and blood components that ______________________________________________  (Signature of Patient)                                                            (Responsible party in case of Minor,

## (undated) NOTE — LETTER
[address] [place] [address]  Authorization for Invasive Procedures  1.  I hereby authorize Dr. Sunshine Lee, my physician and whomever may be designated as the doctor's assistant, to perform the following operation and/or procedure:  Revision of n performed for the purposes of advancing medicine, science, and/or education, provided my identity is not revealed. If the procedure has been videotaped, the physician/surgeon will obtain the original videotape.  The hospital will not be responsible for stor My signature below affirms that prior to the time of the procedure, I have explained to the patient and/or his legal representative, the risks and benefits involved in the proposed treatment and any reasonable alternative to the proposed treatment.  I have

## (undated) NOTE — LETTER
[address], [place], [place]     AUTHORIZATION FOR SURGICAL OPERATION OR PROCEDURE    I hereby authorize Dr. Rony Couch MD, my Physician(s) and whomever may be designated as the doctor's Assistant, to perform the fol 4. I consent to the photographing of procedure(s) to be performed for the purposes of advancing medicine, science and/or education, provided my identity is not revealed.  If the procedure has been videotaped, the physician/surgeon will obtain the original v (Witness signature)                                                                                                  (Date)                                (Time)  STATEMENT OF PHYSICIAN My signature below affirms that prior to the time of the procedure;  I

## (undated) NOTE — LETTER
[address] [place] [address] Rd, [place], [place]     AUTHORIZATION FOR SURGICAL OPERATION OR PROCEDURE    I hereby authorize , my Physician(s) and whomever may be designated as the doctor's Assistant, to perform the following oper 4. I consent to the photographing of procedure(s) to be performed for the purposes of advancing medicine, science and/or education, provided my identity is not revealed.  If the procedure has been videotaped, the physician/surgeon will obtain the original v (Witness signature)                                                                                                  (Date)                                (Time)  STATEMENT OF PHYSICIAN My signature below affirms that prior to the time of the procedure;  I

## (undated) NOTE — LETTER
[facility]  Administration of Anesthesia  1. I, Nancy Chiang, or _________________________________ acting on his behalf, (Patient) (Dependent/Representative) request to receive anesthesia for my pending procedure/operation/treatment.   A infections, high spinal block, spinal bleeding, seizure, cardiac arrest and death. 7. AWARENESS: I understand that it is possible (but unlikely) to have explicit memory of events from the operating room while under general anesthesia.   8. ELECTROCONVULSIV unconscious pt /Relationship    My signature below affirms that prior to the time of the procedure, I have explained to the patient and/or his/her guardian, the risks and benefits of undergoing anesthesia, as well as any reasonable alternatives.     _______